# Patient Record
Sex: FEMALE | Race: WHITE | URBAN - METROPOLITAN AREA
[De-identification: names, ages, dates, MRNs, and addresses within clinical notes are randomized per-mention and may not be internally consistent; named-entity substitution may affect disease eponyms.]

---

## 2017-01-25 ENCOUNTER — PATIENT MESSAGE (OUTPATIENT)
Dept: FAMILY MEDICINE CLINIC | Age: 47
End: 2017-01-25

## 2017-01-25 NOTE — TELEPHONE ENCOUNTER
From: Abdi Vasquez  To: Tacos Gerardo MD  Sent: 1/25/2017 7:29 AM EST  Subject: Referral Request    Good Morning Minna Nova,  I have been a patient of Dr. Zoe Castro at [facility]; however my referral will be expiring in February. Can you please request an extension to my referral. I am scheduled to have RNY gastric bypass surgery with Dr. Tarun Bennett at [facility] on March 31st. Due to my blood clotting disorder, I am required to obtain a final pre surgical clearance from Dr. Larry Hernandez in the last two weeks of March. Launa said to please do this as a continuation of care and note that it is for a pre-surgical clearance. Thank you so much! I will be in to see Dr. Jeremie Friedman soon.

## 2017-01-25 NOTE — TELEPHONE ENCOUNTER
Please advise/order new referral with appropriate dx code for gastric bypass. Pt has  Prime, will to submit need auth.

## 2017-01-30 DIAGNOSIS — Z01.818 PRE-OP EXAM: ICD-10-CM

## 2017-01-30 DIAGNOSIS — D68.51 FACTOR V LEIDEN MUTATION (HCC): Primary | ICD-10-CM

## 2017-02-07 ENCOUNTER — OFFICE VISIT (OUTPATIENT)
Dept: FAMILY MEDICINE CLINIC | Age: 47
End: 2017-02-07

## 2017-02-07 VITALS
TEMPERATURE: 97 F | HEART RATE: 91 BPM | RESPIRATION RATE: 15 BRPM | DIASTOLIC BLOOD PRESSURE: 90 MMHG | OXYGEN SATURATION: 94 % | BODY MASS INDEX: 36.19 KG/M2 | SYSTOLIC BLOOD PRESSURE: 156 MMHG | HEIGHT: 64 IN | WEIGHT: 212 LBS

## 2017-02-07 DIAGNOSIS — I10 ESSENTIAL HYPERTENSION: ICD-10-CM

## 2017-02-07 DIAGNOSIS — R73.03 PRE-DIABETES: ICD-10-CM

## 2017-02-07 DIAGNOSIS — E55.9 VITAMIN D DEFICIENCY: Primary | ICD-10-CM

## 2017-02-07 RX ORDER — ACETAMINOPHEN 500 MG
TABLET ORAL 2 TIMES DAILY
COMMUNITY
End: 2017-08-03

## 2017-02-07 RX ORDER — HYDROCHLOROTHIAZIDE 12.5 MG/1
12.5 TABLET ORAL DAILY
Qty: 90 TAB | Refills: 1 | Status: SHIPPED | OUTPATIENT
Start: 2017-02-07 | End: 2017-08-03

## 2017-02-07 RX ORDER — METOPROLOL SUCCINATE 25 MG/1
25 TABLET, EXTENDED RELEASE ORAL DAILY
Qty: 90 TAB | Refills: 1 | Status: SHIPPED | OUTPATIENT
Start: 2017-02-07 | End: 2017-10-13

## 2017-02-07 NOTE — PROGRESS NOTES
1. Have you been to the ER, urgent care clinic since your last visit? Hospitalized since your last visit? No    2. Have you seen or consulted any other health care providers outside of the [address] [place] [address] since your last visit? Include any pap smears or colon screening.  Yes Where: Pt planning gastric bypass in Mar 2017, seen multiple drs to prepare

## 2017-02-07 NOTE — PROGRESS NOTES
HISTORY OF PRESENT ILLNESS  Ganesh Millan is a 48 y.o. female. HPI  Vit D def, improving, she is taking vit D daily    HTN, stable but she ran out of toprol for over a week, her bp is elevated today    Pre diabetes, stable, she is compliant with diet, lost 24 lbs since last visit  Review of Systems   Respiratory: Negative for shortness of breath. Cardiovascular: Negative for chest pain and palpitations. Gastrointestinal: Negative for abdominal pain and nausea. Neurological: Negative for headaches. Physical Exam   Cardiovascular: Normal rate, regular rhythm and normal heart sounds. Pulmonary/Chest: Effort normal and breath sounds normal.   Abdominal: Soft. There is no tenderness. Musculoskeletal: She exhibits no edema. Vitals reviewed. ASSESSMENT and Matthew Daughters was seen today for medication refill. Diagnoses and all orders for this visit:    Vitamin D deficiency, improving  -     VITAMIN D, 25 HYDROXY; Future    Essential hypertension, re start toprol and HCTZ, monitor bp  -     hydroCHLOROthiazide (HYDRODIURIL) 12.5 mg tablet; Take 1 Tab by mouth daily. -     metoprolol succinate (TOPROL-XL) 25 mg XL tablet; Take 1 Tab by mouth daily.  -     METABOLIC PANEL, BASIC; Future    Pre-diabetes, improving  -     HEMOGLOBIN A1C W/O EAG;  Future

## 2017-03-30 ENCOUNTER — OFFICE VISIT (OUTPATIENT)
Dept: FAMILY MEDICINE CLINIC | Age: 47
End: 2017-03-30

## 2017-03-30 VITALS
HEART RATE: 113 BPM | RESPIRATION RATE: 16 BRPM | OXYGEN SATURATION: 97 % | SYSTOLIC BLOOD PRESSURE: 112 MMHG | HEIGHT: 64 IN | TEMPERATURE: 97.9 F | WEIGHT: 207 LBS | DIASTOLIC BLOOD PRESSURE: 66 MMHG | BODY MASS INDEX: 35.34 KG/M2

## 2017-03-30 DIAGNOSIS — J11.1 INFLUENZA: Primary | ICD-10-CM

## 2017-03-30 DIAGNOSIS — R68.89 FLU-LIKE SYMPTOMS: ICD-10-CM

## 2017-03-30 DIAGNOSIS — Z87.19 HISTORY OF DIVERTICULITIS: ICD-10-CM

## 2017-03-30 LAB
FLUAV+FLUBV AG NOSE QL IA.RAPID: NEGATIVE POS/NEG
FLUAV+FLUBV AG NOSE QL IA.RAPID: POSITIVE POS/NEG
VALID INTERNAL CONTROL?: YES

## 2017-03-30 RX ORDER — OSELTAMIVIR PHOSPHATE 75 MG/1
75 CAPSULE ORAL 2 TIMES DAILY
Qty: 10 CAP | Refills: 0 | Status: SHIPPED | OUTPATIENT
Start: 2017-03-30 | End: 2017-04-04

## 2017-03-30 RX ORDER — HYDROCODONE POLISTIREX AND CHLORPHENIRAMINE POLISTIREX 10; 8 MG/5ML; MG/5ML
5 SUSPENSION, EXTENDED RELEASE ORAL
Qty: 120 ML | Refills: 0 | Status: SHIPPED | OUTPATIENT
Start: 2017-03-30 | End: 2017-04-21

## 2017-03-30 NOTE — MR AVS SNAPSHOT
Visit Information Date & Time Provider Department Dept. Phone Encounter #  
 3/30/2017 11:30 AM Lizz Santa [facility] [phone] [id number] Your Appointments 10/13/2017 10:45 AM  
ESTABLISHED PATIENT with Viry Garcia MD  
[facility] of [facility]. [address] ([facility]) Appt Note: 1YR F/UP HOLLY  
 [address] [address]  
[phone]  
  
   
 [place] [address] Upcoming Health Maintenance Date Due DTaP/Tdap/Td series (1 - Tdap) 11/25/1989 INFLUENZA AGE 9 TO ADULT 8/1/2016 PAP AKA CERVICAL CYTOLOGY 12/15/2017 Allergies as of 3/30/2017  Review Complete On: 3/30/2017 By: Ravi Choudhury MD  
  
 Severity Noted Reaction Type Reactions Bee Sting [Sting, Bee]  03/28/2012    Swelling Current Immunizations  Never Reviewed Name Date Influenza Vaccine 1/15/2013 Not reviewed this visit You Were Diagnosed With   
  
 Codes Comments Influenza    -  Primary ICD-10-CM: J11.1 ICD-9-CM: 651.0 Flu-like symptoms     ICD-10-CM: R68.89 ICD-9-CM: 780.99 History of diverticulitis     ICD-10-CM: Z87.19 ICD-9-CM: V12.70 Vitals BP Pulse Temp Resp Height(growth percentile) Weight(growth percentile) 112/66 (BP 1 Location: Left arm, BP Patient Position: Sitting) (!) 113 97.9 °F (36.6 °C) (Oral) 16 5' 4\" (1.626 m) 207 lb (93.9 kg) SpO2 BMI OB Status Smoking Status 97% 35.53 kg/m2 Having regular periods Never Smoker Vitals History BMI and BSA Data Body Mass Index Body Surface Area 35.53 kg/m 2 2.06 m 2 Preferred Pharmacy Pharmacy Name Phone [address] [facility] [address]  [facility] [phone] Your Updated Medication List  
  
   
This list is accurate as of: 3/30/17 11:55 AM.  Always use your most recent med list.  
  
  
  
  
 chlorpheniramine-HYDROcodone 10-8 mg/5 mL suspension Commonly known as:  Galo Saas Take 5 mL by mouth every twelve (12) hours as needed for Cough. Max Daily Amount: 10 mL. Cholecalciferol (Vitamin D3) 2,000 unit Cap capsule Commonly known as:  VITAMIN D3 Take  by mouth two (2) times a day. hydroCHLOROthiazide 12.5 mg tablet Commonly known as:  HYDRODIURIL Take 1 Tab by mouth daily. metoprolol succinate 25 mg XL tablet Commonly known as:  TOPROL-XL Take 1 Tab by mouth daily. oseltamivir 75 mg capsule Commonly known as:  TAMIFLU Take 1 Cap by mouth two (2) times a day for 5 days. Prescriptions Printed Refills  
 chlorpheniramine-HYDROcodone (TUSSIONEX) 10-8 mg/5 mL suspension 0 Sig: Take 5 mL by mouth every twelve (12) hours as needed for Cough. Max Daily Amount: 10 mL. Class: Print Route: Oral  
  
Prescriptions Sent to Pharmacy Refills  
 oseltamivir (TAMIFLU) 75 mg capsule 0 Sig: Take 1 Cap by mouth two (2) times a day for 5 days. Class: Normal  
 Pharmacy: [facility] [address] [facility], [address] [place] [address].  #: [phone] Route: Oral  
  
We Performed the Following AMB POC ROLANDA INFLUENZA A/B TEST [95275 CPT(R)] Patient Instructions Oseltamiver 75 mg twice daily x 5 days, start right away Increase fluids, rest, ibuprofen or tylenol as needed for body aches and fever Tussionex, no more than 5 mL every 12 hours if needed for cough Follow up for new symptoms, worsening symptoms or failure to improve. Influenza (Flu): Care Instructions Your Care Instructions Influenza (flu) is an infection in the lungs and breathing passages. It is caused by the influenza virus. There are different strains, or types, of the flu virus from year to year.  Unlike the common cold, the flu comes on suddenly and the symptoms, such as a cough, congestion, fever, chills, fatigue, aches, and pains, are more severe. These symptoms may last up to 10 days. Although the flu can make you feel very sick, it usually doesn't cause serious health problems. Home treatment is usually all you need for flu symptoms. But your doctor may prescribe antiviral medicine to prevent other health problems, such as pneumonia, from developing. Older people and those who have a long-term health condition, such as lung disease, are most at risk for having pneumonia or other health problems. Follow-up care is a key part of your treatment and safety. Be sure to make and go to all appointments, and call your doctor if you are having problems. Its also a good idea to know your test results and keep a list of the medicines you take. How can you care for yourself at home? · Get plenty of rest. 
· Drink plenty of fluids, enough so that your urine is light yellow or clear like water. If you have kidney, heart, or liver disease and have to limit fluids, talk with your doctor before you increase the amount of fluids you drink. · Take an over-the-counter pain medicine if needed, such as acetaminophen (Tylenol), ibuprofen (Advil, Motrin), or naproxen (Aleve), to relieve fever, headache, and muscle aches. Read and follow all instructions on the label. No one younger than 20 should take aspirin. It has been linked to Reye syndrome, a serious illness. · Do not smoke. Smoking can make the flu worse. If you need help quitting, talk to your doctor about stop-smoking programs and medicines. These can increase your chances of quitting for good. · Breathe moist air from a hot shower or from a sink filled with hot water to help clear a stuffy nose. · Before you use cough and cold medicines, check the label. These medicines may not be safe for young children or for people with certain health problems. · If the skin around your nose and lips becomes sore, put some petroleum jelly on the area. · To ease coughing: ¨ Drink fluids to soothe a scratchy throat. ¨ Suck on cough drops or plain hard candy. ¨ Take an over-the-counter cough medicine that contains dextromethorphan to help you get some sleep. Read and follow all instructions on the label. ¨ Raise your head at night with an extra pillow. This may help you rest if coughing keeps you awake. · Take any prescribed medicine exactly as directed. Call your doctor if you think you are having a problem with your medicine. To avoid spreading the flu · Wash your hands regularly, and keep your hands away from your face. · Stay home from school, work, and other public places until you are feeling better and your fever has been gone for at least 24 hours. The fever needs to have gone away on its own without the help of medicine. · Ask people living with you to talk to their doctors about preventing the flu. They may get antiviral medicine to keep from getting the flu from you. · To prevent the flu in the future, get a flu vaccine every fall. Encourage people living with you to get the vaccine. · Cover your mouth when you cough or sneeze. When should you call for help? Call 911 anytime you think you may need emergency care. For example, call if: 
· You have severe trouble breathing. Call your doctor now or seek immediate medical care if: 
· You have new or worse trouble breathing. · You seem to be getting much sicker. · You feel very sleepy or confused. · You have a new or higher fever. · You get a new rash. Watch closely for changes in your health, and be sure to contact your doctor if: 
· You begin to get better and then get worse. · You are not getting better after 1 week. Where can you learn more? Go to http://ishmael-megan.info/. Enter X575 in the search box to learn more about \"Influenza (Flu): Care Instructions. \" 
 Current as of: May 23, 2016 Content Version: 11.2 © 0059-1126 [employer]. Care instructions adapted under license by [employer] (which disclaims liability or warranty for this information). If you have questions about a medical condition or this instruction, always ask your healthcare professional. [address] any warranty or liability for your use of this information. Introducing [facility]! Dear Katharine Real: 
Thank you for requesting a Storage By The Box account. Our records indicate that you already have an active [employer] The Box account. You can access your account anytime at [email or web address] Did you know that you can access your hospital and ER discharge instructions at any time in Storage By The Box? You can also review all of your test results from your hospital stay or ER visit. Additional Information If you have questions, please visit the Frequently Asked Questions section of the [employer] Box website at [email or web address] Remember, Storage By The Box is NOT to be used for urgent needs. For medical emergencies, dial 911. Now available from your iPhone and Android! Please provide this summary of care documentation to your next provider. Your primary care clinician is listed as Ysabel Thorpe. If you have any questions after today's visit, please call [phone].

## 2017-03-30 NOTE — PROGRESS NOTES
HISTORY OF PRESENT ILLNESS  Brandyn Ramirez is a 48 y.o. female. HPI Comments: She is just completeing courses of Cipro and Flagyl prescribed at the [facility] ED for divertivulitis. Abdominal Pain   The history is provided by the patient and medical records. This is a recurrent problem. Episode onset: about 10 days ago, seen a week ago at [facility] ED, better with antibiotic treatment. Associated symptoms include abdominal pain and headaches. Cold Symptoms   The history is provided by the patient and medical records. This is a new problem. Episode onset: 3 days ago. Associated symptoms include chills, ear pain (pressure, this morning), headaches, myalgias and nausea. Pertinent negatives include no sore throat and no vomiting. Review of Systems   Constitutional: Positive for chills, fever (subjective) and malaise/fatigue. HENT: Positive for congestion (mild nasal) and ear pain (pressure, this morning). Negative for sore throat. Respiratory: Positive for cough and sputum production (yesterday, mucous, dry today). Gastrointestinal: Positive for abdominal pain and nausea. Negative for diarrhea and vomiting. Genitourinary: Negative for dysuria, frequency and urgency. Musculoskeletal: Positive for myalgias. Neurological: Positive for headaches. Visit Vitals    /66 (BP 1 Location: Left arm, BP Patient Position: Sitting)    Pulse (!) 113    Temp 97.9 °F (36.6 °C) (Oral)    Resp 16    Ht 5' 4\" (1.626 m)    Wt 207 lb (93.9 kg)    SpO2 97%    BMI 35.53 kg/m2       Physical Exam   Constitutional: She is oriented to person, place, and time. She appears well-developed and well-nourished. HENT:   Head: Normocephalic. Right Ear: Tympanic membrane and ear canal normal.   Left Ear: Tympanic membrane and ear canal normal.   Mouth/Throat: Oropharynx is clear and moist.   Eyes: Conjunctivae and EOM are normal.   Neck: Neck supple.    Cardiovascular: Normal rate, regular rhythm and normal heart sounds. Pulmonary/Chest: Effort normal and breath sounds normal.   Abdominal: Soft. Bowel sounds are normal. There is tenderness (mild LLQ). There is no rebound and no guarding. Lymphadenopathy:     She has no cervical adenopathy. Neurological: She is alert and oriented to person, place, and time. Skin: Skin is warm and dry. Psychiatric: She has a normal mood and affect. Her behavior is normal.   Nursing note and vitals reviewed. Rapid flu positive for influenza type B  ASSESSMENT and PLAN    ICD-10-CM ICD-9-CM    1. Influenza J11.1 487.1 oseltamivir (TAMIFLU) 75 mg capsule   2. Flu-like symptoms R68.89 780.99 AMB POC ROLANDA INFLUENZA A/B TEST      chlorpheniramine-HYDROcodone (TUSSIONEX) 10-8 mg/5 mL suspension   Oseltamiver 75 mg twice daily x 5 days, start right away  Increase fluids, rest, ibuprofen or tylenol as needed for body aches and fever  Tussionex, no more than 5 mL every 12 hours if needed for cough  Follow up for new symptoms, worsening symptoms or failure to improve.

## 2017-03-30 NOTE — PROGRESS NOTES
Susi Aase is a 48 y.o. female here for cold symptoms      1. Have you been to the ER, urgent care clinic or hospitalized since your last visit? [facility]    2. Have you seen or consulted any other health care providers outside of the [address] [place] [address] since your last visit (Include any pap smears or colon screening)? NO      Do you have an Advanced Directive? NO    Would you like information on Advanced Directives?  NO

## 2017-03-30 NOTE — PATIENT INSTRUCTIONS
Oseltamiver 75 mg twice daily x 5 days, start right away  Increase fluids, rest, ibuprofen or tylenol as needed for body aches and fever  Tussionex, no more than 5 mL every 12 hours if needed for cough  Follow up for new symptoms, worsening symptoms or failure to improve. Influenza (Flu): Care Instructions  Your Care Instructions  Influenza (flu) is an infection in the lungs and breathing passages. It is caused by the influenza virus. There are different strains, or types, of the flu virus from year to year. Unlike the common cold, the flu comes on suddenly and the symptoms, such as a cough, congestion, fever, chills, fatigue, aches, and pains, are more severe. These symptoms may last up to 10 days. Although the flu can make you feel very sick, it usually doesn't cause serious health problems. Home treatment is usually all you need for flu symptoms. But your doctor may prescribe antiviral medicine to prevent other health problems, such as pneumonia, from developing. Older people and those who have a long-term health condition, such as lung disease, are most at risk for having pneumonia or other health problems. Follow-up care is a key part of your treatment and safety. Be sure to make and go to all appointments, and call your doctor if you are having problems. Its also a good idea to know your test results and keep a list of the medicines you take. How can you care for yourself at home? · Get plenty of rest.  · Drink plenty of fluids, enough so that your urine is light yellow or clear like water. If you have kidney, heart, or liver disease and have to limit fluids, talk with your doctor before you increase the amount of fluids you drink. · Take an over-the-counter pain medicine if needed, such as acetaminophen (Tylenol), ibuprofen (Advil, Motrin), or naproxen (Aleve), to relieve fever, headache, and muscle aches. Read and follow all instructions on the label. No one younger than 20 should take aspirin. It has been linked to Reye syndrome, a serious illness. · Do not smoke. Smoking can make the flu worse. If you need help quitting, talk to your doctor about stop-smoking programs and medicines. These can increase your chances of quitting for good. · Breathe moist air from a hot shower or from a sink filled with hot water to help clear a stuffy nose. · Before you use cough and cold medicines, check the label. These medicines may not be safe for young children or for people with certain health problems. · If the skin around your nose and lips becomes sore, put some petroleum jelly on the area. · To ease coughing:  ¨ Drink fluids to soothe a scratchy throat. ¨ Suck on cough drops or plain hard candy. ¨ Take an over-the-counter cough medicine that contains dextromethorphan to help you get some sleep. Read and follow all instructions on the label. ¨ Raise your head at night with an extra pillow. This may help you rest if coughing keeps you awake. · Take any prescribed medicine exactly as directed. Call your doctor if you think you are having a problem with your medicine. To avoid spreading the flu  · Wash your hands regularly, and keep your hands away from your face. · Stay home from school, work, and other public places until you are feeling better and your fever has been gone for at least 24 hours. The fever needs to have gone away on its own without the help of medicine. · Ask people living with you to talk to their doctors about preventing the flu. They may get antiviral medicine to keep from getting the flu from you. · To prevent the flu in the future, get a flu vaccine every fall. Encourage people living with you to get the vaccine. · Cover your mouth when you cough or sneeze. When should you call for help? Call 911 anytime you think you may need emergency care. For example, call if:  · You have severe trouble breathing.   Call your doctor now or seek immediate medical care if:  · You have new or worse trouble breathing. · You seem to be getting much sicker. · You feel very sleepy or confused. · You have a new or higher fever. · You get a new rash. Watch closely for changes in your health, and be sure to contact your doctor if:  · You begin to get better and then get worse. · You are not getting better after 1 week. Where can you learn more? Go to http://ishmael-megan.info/. Enter F090 in the search box to learn more about \"Influenza (Flu): Care Instructions. \"  Current as of: May 23, 2016  Content Version: 11.2  © 0526-0963 Logi-Serve. Care instructions adapted under license by [employer] (which disclaims liability or warranty for this information). If you have questions about a medical condition or this instruction, always ask your healthcare professional. [address] any warranty or liability for your use of this information.

## 2017-03-30 NOTE — LETTER
NOTIFICATION RETURN TO WORK / SCHOOL 
 
3/30/2017 11:53 AM 
 
Ms. Mick Leon [place] [address] [place] [address] To Whom It May Concern: Mick Leon is currently under the care of [address] [place] [address]. She has been diagnosed with influenza. She will return to work/school on: 4/3/2017 If there are questions or concerns please have the patient contact our office. Sincerely, Jil Sutton MD

## 2017-04-21 ENCOUNTER — OFFICE VISIT (OUTPATIENT)
Dept: FAMILY MEDICINE CLINIC | Age: 47
End: 2017-04-21

## 2017-04-21 VITALS
SYSTOLIC BLOOD PRESSURE: 118 MMHG | WEIGHT: 210.6 LBS | OXYGEN SATURATION: 97 % | RESPIRATION RATE: 16 BRPM | BODY MASS INDEX: 35.96 KG/M2 | HEART RATE: 80 BPM | HEIGHT: 64 IN | TEMPERATURE: 98 F | DIASTOLIC BLOOD PRESSURE: 82 MMHG

## 2017-04-21 DIAGNOSIS — R05.8 POST-VIRAL COUGH SYNDROME: Primary | ICD-10-CM

## 2017-04-21 RX ORDER — BENZONATATE 200 MG/1
CAPSULE ORAL
Qty: 15 CAP | Refills: 2 | Status: SHIPPED | OUTPATIENT
Start: 2017-04-21 | End: 2017-08-03

## 2017-04-21 RX ORDER — METHYLPREDNISOLONE 4 MG/1
TABLET ORAL
Qty: 1 DOSE PACK | Refills: 0 | Status: SHIPPED | OUTPATIENT
Start: 2017-04-21 | End: 2017-08-03

## 2017-04-21 NOTE — MR AVS SNAPSHOT
Visit Information Date & Time Provider Department Dept. Phone Encounter #  
 4/21/2017  7:45 AM Jas Eugene, [facility] [phone] [id number] Your Appointments 10/13/2017 10:45 AM  
ESTABLISHED PATIENT with Guillermo Velásquez MD  
[facility] of [facility] ([address]) Appt Note: 1YR F/UP HOLLY  
 [address] [address]  
[phone]  
  
   
 [address] [address] Upcoming Health Maintenance Date Due DTaP/Tdap/Td series (1 - Tdap) 11/25/1989 INFLUENZA AGE 9 TO ADULT 8/1/2016 PAP AKA CERVICAL CYTOLOGY 12/15/2017 Allergies as of 4/21/2017  Review Complete On: 4/21/2017 By: Jas Eugene MD  
  
 Severity Noted Reaction Type Reactions Bee Sting [Sting, Bee]  03/28/2012    Swelling Current Immunizations  Never Reviewed Name Date Influenza Vaccine 1/15/2013 Not reviewed this visit You Were Diagnosed With   
  
 Codes Comments Post-viral cough syndrome    -  Primary ICD-10-CM: H18 ICD-9-CM: 098. 2 Vitals BP Pulse Temp Resp Height(growth percentile) Weight(growth percentile) 118/82 (BP 1 Location: Left arm, BP Patient Position: Sitting) 80 98 °F (36.7 °C) (Oral) 16 5' 4\" (1.626 m) 210 lb 9.6 oz (95.5 kg) SpO2 BMI OB Status Smoking Status 97% 36.15 kg/m2 Having regular periods Never Smoker BMI and BSA Data Body Mass Index Body Surface Area  
 36.15 kg/m 2 2.08 m 2 Preferred Pharmacy Pharmacy Name Phone [address] [facility], [address]  [facility] [phone] Your Updated Medication List  
  
   
This list is accurate as of: 4/21/17  7:58 AM.  Always use your most recent med list.  
  
  
  
  
 benzonatate 200 mg capsule Commonly known as:  TESSALON Take one capsule 3 times daily if needed for cough Cholecalciferol (Vitamin D3) 2,000 unit Cap capsule Commonly known as:  VITAMIN D3 Take  by mouth two (2) times a day. hydroCHLOROthiazide 12.5 mg tablet Commonly known as:  HYDRODIURIL Take 1 Tab by mouth daily. methylPREDNISolone 4 mg tablet Commonly known as:  Caye Ser Follow dose pack instructions  
  
 metoprolol succinate 25 mg XL tablet Commonly known as:  TOPROL-XL Take 1 Tab by mouth daily. Prescriptions Sent to Pharmacy Refills  
 methylPREDNISolone (MEDROL DOSEPACK) 4 mg tablet 0 Sig: Follow dose pack instructions Class: Normal  
 Pharmacy: [facility] [address] AT [address] & [facility] Ph #: [phone]  
 benzonatate (TESSALON) 200 mg capsule 2 Sig: Take one capsule 3 times daily if needed for cough Class: Normal  
 Pharmacy: [facility] [address], [address] [place] [address]. Ph #: [phone] Patient Instructions Medrol dose pack as per instructions Tessalon Perls - Take one capsule 3 times daily if needed for cough Follow up for new symptoms, worsening symptoms or failure to improve. Introducing [facility]! Dear Norman Porras: 
Thank you for requesting a Fleck account. Our records indicate that you already have an active Fleck account. You can access your account anytime at [email or web address] Did you know that you can access your hospital and ER discharge instructions at any time in Fleck? You can also review all of your test results from your hospital stay or ER visit. Additional Information If you have questions, please visit the Frequently Asked Questions section of the Fleck website at [email or web address] Remember, Fleck is NOT to be used for urgent needs. For medical emergencies, dial 911. Now available from your iPhone and Android! Please provide this summary of care documentation to your next provider. Your primary care clinician is listed as Shruti Ashton. If you have any questions after today's visit, please call [phone].

## 2017-04-21 NOTE — PROGRESS NOTES
HISTORY OF PRESENT ILLNESS  Leighann Rhoades is a 48 y.o. female. HPI Comments: Seen with influenza 3 weeks ago, now with persistent dry cough    Cough   The history is provided by the patient and medical records. This is a chronic problem. Episode onset: 3 weeks ago. Pertinent negatives include no chest pain, no headaches and no shortness of breath. Review of Systems   Constitutional: Negative for chills and fever. HENT: Negative for congestion. Respiratory: Positive for cough. Negative for sputum production, shortness of breath and wheezing. Cardiovascular: Negative for chest pain and palpitations. Gastrointestinal: Negative for nausea and vomiting. Neurological: Negative for headaches. Endo/Heme/Allergies: Negative for environmental allergies. Visit Vitals    /82 (BP 1 Location: Left arm, BP Patient Position: Sitting)    Pulse 80    Temp 98 °F (36.7 °C) (Oral)    Resp 16    Ht 5' 4\" (1.626 m)    Wt 210 lb 9.6 oz (95.5 kg)    SpO2 97%    BMI 36.15 kg/m2       Physical Exam   Constitutional: She is oriented to person, place, and time. She appears well-developed and well-nourished. HENT:   Head: Normocephalic. Right Ear: Tympanic membrane and ear canal normal.   Left Ear: Tympanic membrane and ear canal normal.   Mouth/Throat: Oropharynx is clear and moist.   Eyes: Conjunctivae and EOM are normal.   Neck: Neck supple. Cardiovascular: Normal rate, regular rhythm and normal heart sounds. Pulmonary/Chest: Effort normal and breath sounds normal.   Lymphadenopathy:     She has no cervical adenopathy. Neurological: She is alert and oriented to person, place, and time. Skin: Skin is warm and dry. Psychiatric: She has a normal mood and affect. Her behavior is normal.   Nursing note and vitals reviewed.       ASSESSMENT and PLAN    ICD-10-CM ICD-9-CM    1. Post-viral cough syndrome R05 786.2 methylPREDNISolone (MEDROL DOSEPACK) 4 mg tablet      benzonatate (TESSALON) 200 mg capsule   Medrol dose pack as per instructions  Tessalon Perls - Take one capsule 3 times daily if needed for cough  Follow up for new symptoms, worsening symptoms or failure to improve.

## 2017-04-21 NOTE — PROGRESS NOTES
Lawson Tesfaey is a 48 y.o. female here for cough      1. Have you been to the ER, urgent care clinic or hospitalized since your last visit? NO.     2. Have you seen or consulted any other health care providers outside of the [address] [place] [address] since your last visit (Include any pap smears or colon screening)? NO      Do you have an Advanced Directive? NO    Would you like information on Advanced Directives?  NO

## 2017-04-21 NOTE — PATIENT INSTRUCTIONS
Medrol dose pack as per instructions  Tessalon Perls - Take one capsule 3 times daily if needed for cough  Follow up for new symptoms, worsening symptoms or failure to improve.

## 2017-08-03 ENCOUNTER — OFFICE VISIT (OUTPATIENT)
Dept: FAMILY MEDICINE CLINIC | Age: 47
End: 2017-08-03

## 2017-08-03 VITALS
BODY MASS INDEX: 31.49 KG/M2 | TEMPERATURE: 97 F | OXYGEN SATURATION: 95 % | SYSTOLIC BLOOD PRESSURE: 110 MMHG | WEIGHT: 189 LBS | HEART RATE: 92 BPM | DIASTOLIC BLOOD PRESSURE: 84 MMHG | HEIGHT: 65 IN | RESPIRATION RATE: 20 BRPM

## 2017-08-03 DIAGNOSIS — Z98.84 S/P GASTRIC BYPASS: ICD-10-CM

## 2017-08-03 DIAGNOSIS — I10 ESSENTIAL HYPERTENSION: Primary | ICD-10-CM

## 2017-08-03 DIAGNOSIS — E55.9 VITAMIN D DEFICIENCY: ICD-10-CM

## 2017-08-03 RX ORDER — LANOLIN ALCOHOL/MO/W.PET/CERES
1000 CREAM (GRAM) TOPICAL
COMMUNITY

## 2017-08-03 RX ORDER — CHOLECALCIFEROL TAB 125 MCG (5000 UNIT) 125 MCG
TAB ORAL DAILY
COMMUNITY

## 2017-08-03 RX ORDER — ENOXAPARIN SODIUM 100 MG/ML
40 INJECTION SUBCUTANEOUS
COMMUNITY
End: 2017-10-13

## 2017-08-03 NOTE — PROGRESS NOTES
HISTORY OF PRESENT ILLNESS  Radha Arevalo is a 48 y.o. female. HPI  HTN, stable, bp is well controlled  Vit D def, stable on 5000 units daily  S/p recent gastric bypass surgery, doing well, lost 20 lbs already, her labs are monitored by surgery for now  Review of Systems   Cardiovascular: Negative for chest pain and palpitations. Gastrointestinal: Negative for abdominal pain and nausea. Neurological: Negative for headaches. Physical Exam   Constitutional: She is oriented to person, place, and time. Cardiovascular: Normal rate, regular rhythm and normal heart sounds. No murmur heard. Pulmonary/Chest: Effort normal and breath sounds normal.   Abdominal: Soft. There is no tenderness. Neurological: She is alert and oriented to person, place, and time. Vitals reviewed. ASSESSMENT and PLAN  Diagnoses and all orders for this visit:    1. Essential hypertension, stable    2. Vitamin D deficiency, stable    3.  S/P gastric bypass  Continue current meds    rtc 3 mos

## 2017-08-03 NOTE — PROGRESS NOTES
1. Have you been to the ER, urgent care clinic since your last visit? Hospitalized since your last visit? Yes When: 7/24/17 [facility]    2. Have you seen or consulted any other health care providers outside of the [address] [place] [address] since your last visit? Include any pap smears or colon screening.  Yes When: 6/301/17 Colonoscopy- GI

## 2017-09-20 ENCOUNTER — OFFICE VISIT (OUTPATIENT)
Dept: FAMILY MEDICINE CLINIC | Age: 47
End: 2017-09-20

## 2017-09-20 ENCOUNTER — HOSPITAL ENCOUNTER (OUTPATIENT)
Dept: LAB | Age: 47
Discharge: HOME | End: 2017-09-20
Payer: OTHER GOVERNMENT

## 2017-09-20 VITALS
HEART RATE: 74 BPM | BODY MASS INDEX: 29.69 KG/M2 | RESPIRATION RATE: 18 BRPM | WEIGHT: 178.2 LBS | TEMPERATURE: 97.8 F | HEIGHT: 65 IN | SYSTOLIC BLOOD PRESSURE: 100 MMHG | DIASTOLIC BLOOD PRESSURE: 82 MMHG | OXYGEN SATURATION: 98 %

## 2017-09-20 DIAGNOSIS — R30.9 URINARY PAIN: Primary | ICD-10-CM

## 2017-09-20 DIAGNOSIS — R30.9 URINARY PAIN: ICD-10-CM

## 2017-09-20 LAB
BILIRUB UR QL STRIP: NORMAL
GLUCOSE UR-MCNC: NEGATIVE MG/DL
KETONES P FAST UR STRIP-MCNC: NEGATIVE MG/DL
PH UR STRIP: 6.5 [PH] (ref 4.6–8)
PROT UR QL STRIP: NEGATIVE MG/DL
SP GR UR STRIP: 1.02 (ref 1–1.03)
UA UROBILINOGEN AMB POC: NORMAL (ref 0.2–1)
URINALYSIS CLARITY POC: CLEAR
URINALYSIS COLOR POC: YELLOW
URINE BLOOD POC: NEGATIVE
URINE LEUKOCYTES POC: NEGATIVE
URINE NITRITES POC: NEGATIVE

## 2017-09-20 PROCEDURE — 87086 URINE CULTURE/COLONY COUNT: CPT | Performed by: INTERNAL MEDICINE

## 2017-09-20 RX ORDER — NITROFURANTOIN 25; 75 MG/1; MG/1
CAPSULE ORAL
COMMUNITY
Start: 2017-09-08 | End: 2017-10-13

## 2017-09-20 NOTE — PATIENT INSTRUCTIONS
Painful Urination (Dysuria): Care Instructions  Your Care Instructions  Burning pain with urination (dysuria) is a common symptom of a urinary tract infection or other urinary problems. The bladder may become inflamed. This can cause pain when the bladder fills and empties. You may also feel pain if the tube that carries urine from the bladder to the outside of the body (urethra) gets irritated or infected. Sexually transmitted infections (STIs) also may cause pain when you urinate. Sometimes the pain can be caused by things other than an infection. The urethra can be irritated by soaps, perfumes, or foreign objects in the urethra. Kidney stones can cause pain when they pass through the urethra. The cause may be hard to find. You may need tests. Treatment for painful urination depends on the cause. Follow-up care is a key part of your treatment and safety. Be sure to make and go to all appointments, and call your doctor if you are having problems. It's also a good idea to know your test results and keep a list of the medicines you take. How can you care for yourself at home? · Drink extra water for the next day or two. This will help make the urine less concentrated. (If you have kidney, heart, or liver disease and have to limit fluids, talk with your doctor before you increase the amount of fluids you drink.)  · Avoid drinks that are carbonated or have caffeine. They can irritate the bladder. · Urinate often. Try to empty your bladder each time. For women:  · Urinate right after you have sex. · After going to the bathroom, wipe from front to back. · Avoid douches, bubble baths, and feminine hygiene sprays. And avoid other feminine hygiene products that have deodorants. When should you call for help? Call your doctor now or seek immediate medical care if:  · You have new symptoms, such as fever, nausea, or vomiting. · You have new or worse symptoms of a urinary problem.  For example:  ¨ You have blood or pus in your urine. ¨ You have chills or body aches. ¨ It hurts worse to urinate. ¨ You have groin or belly pain. ¨ You have pain in your back just below your rib cage (the flank area). Watch closely for changes in your health, and be sure to contact your doctor if you have any problems. Where can you learn more? Go to http://ishmael-megan.info/. Enter K332 in the search box to learn more about \"Painful Urination (Dysuria): Care Instructions. \"  Current as of: November 28, 2016  Content Version: 11.3  © 2790-2776 [employer]. Care instructions adapted under license by iodine (which disclaims liability or warranty for this information). If you have questions about a medical condition or this instruction, always ask your healthcare professional. [address] any warranty or liability for your use of this information.

## 2017-09-20 NOTE — MR AVS SNAPSHOT
Visit Information Date & Time Provider Department Dept. Phone Encounter #  
 9/20/2017  9:45 AM Elizabeth Medina [facility] [phone] [id number] Follow-up Instructions Return if symptoms worsen or fail to improve. Your Appointments 10/13/2017 10:45 AM  
ESTABLISHED PATIENT with Severa Redder, MD  
[facility] ([address]) Appt Note: 1YR F/UP HOLLY  
 [address] [address]  
[phone]  
  
   
 [place] [id number] Upcoming Health Maintenance Date Due DTaP/Tdap/Td series (1 - Tdap) 11/25/1989 INFLUENZA AGE 9 TO ADULT 8/1/2017 PAP AKA CERVICAL CYTOLOGY 12/15/2017 Allergies as of 9/20/2017  Review Complete On: 9/20/2017 By: Elizabeth Medina MD  
  
 Severity Noted Reaction Type Reactions Bee Sting [Sting, Bee]  03/28/2012    Swelling Current Immunizations  Never Reviewed Name Date Influenza Vaccine 1/15/2013 Not reviewed this visit You Were Diagnosed With   
  
 Codes Comments Urinary pain    -  Primary ICD-10-CM: R30.9 ICD-9-CM: 094. 1 Vitals BP Pulse Temp Resp Height(growth percentile) Weight(growth percentile) 100/82 (BP 1 Location: Left arm, BP Patient Position: Sitting) 74 97.8 °F (36.6 °C) (Oral) 18 5' 5\" (1.651 m) 178 lb 3.2 oz (80.8 kg) LMP SpO2 BMI OB Status Smoking Status 08/26/2017 98% 29.65 kg/m2 Having regular periods Never Smoker BMI and BSA Data Body Mass Index Body Surface Area  
 29.65 kg/m 2 1.92 m 2 Preferred Pharmacy Pharmacy Name Phone [address] [facility], [facility] [address] AT [address] [place] [address] & [facility] [phone] Your Updated Medication List  
  
   
This list is accurate as of: 9/20/17 10:49 AM.  Always use your most recent med list.  
  
  
  
  
 CALCIUM 500 PO Take 1,000 mg by mouth daily. cholecalciferol (VITAMIN D3) 5,000 unit Tab tablet Commonly known as:  VITAMIN D3 Take  by mouth daily. cyanocobalamin 1,000 mcg tablet Take 1,000 mcg by mouth daily. LOVENOX 40 mg/0.4 mL Generic drug:  enoxaparin 40 mg by SubCUTAneous route. metoprolol succinate 25 mg XL tablet Commonly known as:  TOPROL-XL Take 1 Tab by mouth daily. multivitamins Chew Take  by mouth daily. nitrofurantoin (macrocrystal-monohydrate) 100 mg capsule Commonly known as:  MACROBID We Performed the Following AMB POC URINALYSIS DIP STICK AUTO W/O MICRO [30094 CPT(R)] Follow-up Instructions Return if symptoms worsen or fail to improve. Patient Instructions Painful Urination (Dysuria): Care Instructions Your Care Instructions Burning pain with urination (dysuria) is a common symptom of a urinary tract infection or other urinary problems. The bladder may become inflamed. This can cause pain when the bladder fills and empties. You may also feel pain if the tube that carries urine from the bladder to the outside of the body (urethra) gets irritated or infected. Sexually transmitted infections (STIs) also may cause pain when you urinate. Sometimes the pain can be caused by things other than an infection. The urethra can be irritated by soaps, perfumes, or foreign objects in the urethra. Kidney stones can cause pain when they pass through the urethra. The cause may be hard to find. You may need tests. Treatment for painful urination depends on the cause. Follow-up care is a key part of your treatment and safety. Be sure to make and go to all appointments, and call your doctor if you are having problems. It's also a good idea to know your test results and keep a list of the medicines you take. How can you care for yourself at home? · Drink extra water for the next day or two.  This will help make the urine less concentrated. (If you have kidney, heart, or liver disease and have to limit fluids, talk with your doctor before you increase the amount of fluids you drink.) · Avoid drinks that are carbonated or have caffeine. They can irritate the bladder. · Urinate often. Try to empty your bladder each time. For women: · Urinate right after you have sex. · After going to the bathroom, wipe from front to back. · Avoid douches, bubble baths, and feminine hygiene sprays. And avoid other feminine hygiene products that have deodorants. When should you call for help? Call your doctor now or seek immediate medical care if: 
· You have new symptoms, such as fever, nausea, or vomiting. · You have new or worse symptoms of a urinary problem. For example: ¨ You have blood or pus in your urine. ¨ You have chills or body aches. ¨ It hurts worse to urinate. ¨ You have groin or belly pain. ¨ You have pain in your back just below your rib cage (the flank area). Watch closely for changes in your health, and be sure to contact your doctor if you have any problems. Where can you learn more? Go to http://ishmael-megan.info/. Enter K040 in the search box to learn more about \"Painful Urination (Dysuria): Care Instructions. \" Current as of: November 28, 2016 Content Version: 11.3 © 0168-0141 [employer]. Care instructions adapted under license by [employer] (which disclaims liability or warranty for this information). If you have questions about a medical condition or this instruction, always ask your healthcare professional. [address] any warranty or liability for your use of this information. Introducing [facility] [place] [facility]! Dear Boo Stafford: 
Thank you for requesting a [employer] account. Our records indicate that you already have an active [employer] account. You can access your account anytime at [email or web address] Did you know that you can access your hospital and ER discharge instructions at any time in Un[employer]in? You can also review all of your test results from your hospital stay or ER visit. Additional Information If you have questions, please visit the Frequently Asked Questions section of the [employer] website at [email or web address] Remember, Un[employer]in is NOT to be used for urgent needs. For medical emergencies, dial 911. Now available from your iPhone and Android! Please provide this summary of care documentation to your next provider. Your primary care clinician is listed as Nomi Zuleta. If you have any questions after today's visit, please call [phone].

## 2017-09-20 NOTE — PROGRESS NOTES
Assessment/Plan:    *Diagnoses and all orders for this visit:    1. Urinary pain  -     AMB POC URINALYSIS DIP STICK AUTO W/O MICRO  -     CULTURE, URINE; Future        Will await results of culture. The plan was discussed with the patient. The patient verbalized understanding and is in agreement with the plan. All medication potential side effects were discussed with the patient.    -------------------------------------------------------------------------------------------------------------------        Elijah Armstrong is a 48 y.o. female and presents with Urinary Pain (x2 weeks )         Subjective:  Pt here to f/u after a recent UTI diagnosis. She was seen at [facility], treated with Ana Rosa Lorenz. They made mention about wanting to make she absorbed the abx well b/c she just had a gastric bypass procedure 8 weeks ago. She was advised by the pharmacist to open the macrobid capsules, pour the powder into apple sauce and consume it this way. She did all this well but was advised by [facility] to have her urine rechecked to make sure she was clear. She feels good today. ROS:  Constitutional: No recent weight change. No weakness/fatigue. No f/c. Skin: No rashes, change in nails/hair, itching   HENT: No HA, dizziness. No hearing loss/tinnitus. No nasal congestion/discharge. Eyes: No change in vision, double/blurred vision or eye pain/redness. Cardiovascular: No CP/palpitations. No CA/orthopnea/PND. Respiratory: No cough/sputum, dyspnea, wheezing. Gastointestinal: No dysphagia, reflux. No n/v. No constipation/diarrhea. No melena/rectal bleeding. Genitourinary: No dysuria, urinary hesitancy, nocturia, hematuria. No incontinence. Musculoskeletal: No joint pain/stiffness. No muscle pain/tenderness. Endo: No heat/cold intolerance, no polyuria/polydypsia. Heme: No h/o anemia. No easy bleeding/bruising. Allergy/Immunology: No seasonal rhinitis.  Denies frequent colds, sinus/ear infections. Neurological: No seizures/numbness/weakness. No paresthesias. Psychiatric:  No depression, anxiety. The problem list was updated as a part of today's visit. Patient Active Problem List   Diagnosis Code    PE (pulmonary embolism) I26.99    Other and unspecified hyperlipidemia E78.5    Recurrent UTI N39.0    Factor V Leiden mutation ([facility] D68.51    Pre-diabetes R73.03    SVT (supraventricular tachycardia) ([facility] I47.1    Pulmonary air embolism ([facility] T79. 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 SH:  Social History   Substance Use Topics    Smoking status: Never Smoker    Smokeless tobacco: Never Used    Alcohol use No       Medications/Allergies:  Current Outpatient Prescriptions on File Prior to Visit   Medication Sig Dispense Refill    cyanocobalamin 1,000 mcg tablet Take 1,000 mcg by mouth daily.  CALCIUM CARBONATE (CALCIUM 500 PO) Take 1,000 mg by mouth daily.  multivitamins chew Take  by mouth daily.  cholecalciferol, VITAMIN D3, (VITAMIN D3) 5,000 unit tab tablet Take  by mouth daily.  enoxaparin (LOVENOX) 40 mg/0.4 mL 40 mg by SubCUTAneous route.  metoprolol succinate (TOPROL-XL) 25 mg XL tablet Take 1 Tab by mouth daily. 90 Tab 1     No current facility-administered medications on file prior to visit.          Allergies   Allergen Reactions    Bee Sting [Sting, Bee] Swelling         Health Maintenance:   Health Maintenance   Topic Date Due    DTaP/Tdap/Td series (1 - Tdap) 11/25/1989    INFLUENZA AGE 9 TO ADULT  08/01/2017    PAP AKA CERVICAL CYTOLOGY  12/15/2017       Objective:  Visit Vitals    /82 (BP 1 Location: Left arm, BP Patient Position: Sitting)    Pulse 74    Temp 97.8 °F (36.6 °C) (Oral)    Resp 18    Ht 5' 5\" (1.651 m)    Wt 178 lb 3.2 oz (80.8 kg)    LMP 08/26/2017    SpO2 98%    BMI 29.65 kg/m2          Nurses notes and VS reviewed. Physical Examination: General appearance - alert, well appearing, and in no distress          Labwork and Ancillary Studies:    CBC w/Diff  Lab Results   Component Value Date/Time    WBC 10.6 08/10/2016 09:53 AM    WBC 8.2 06/20/2012 09:23 AM    HGB 13.5 08/10/2016 09:53 AM    PLATELET 798 50/13/3264 09:53 AM         Basic Metabolic Profile  Lab Results   Component Value Date/Time    Sodium 140 08/10/2016 09:53 AM    Potassium 4.8 08/10/2016 09:53 AM    Chloride 106 08/10/2016 09:53 AM    CO2 27 08/10/2016 09:53 AM    Anion gap 7 08/10/2016 09:53 AM    Glucose 91 08/10/2016 09:53 AM    BUN 10 08/10/2016 09:53 AM    Creatinine 0.68 08/10/2016 09:53 AM    BUN/Creatinine ratio 15 08/10/2016 09:53 AM    GFR est AA >60 08/10/2016 09:53 AM    GFR est non-AA >60 08/10/2016 09:53 AM    Calcium 9.2 08/10/2016 09:53 AM         LFT  Lab Results   Component Value Date/Time    ALT (SGPT) 34 08/10/2016 09:53 AM    AST (SGOT) 17 08/10/2016 09:53 AM    Alk.  phosphatase 96 08/10/2016 09:53 AM    Bilirubin, total 0.3 08/10/2016 09:53 AM         Cholesterol  Lab Results   Component Value Date/Time    Cholesterol, total 220 08/10/2016 09:53 AM    HDL Cholesterol 44 08/10/2016 09:53 AM    LDL, calculated 139.4 08/10/2016 09:53 AM    Triglyceride 183 08/10/2016 09:53 AM    CHOL/HDL Ratio 5.0 08/10/2016 09:53 AM

## 2017-09-22 LAB
BACTERIA SPEC CULT: NORMAL
SERVICE CMNT-IMP: NORMAL

## 2018-01-25 ENCOUNTER — OFFICE VISIT (OUTPATIENT)
Dept: FAMILY MEDICINE CLINIC | Age: 48
End: 2018-01-25

## 2018-01-25 VITALS
BODY MASS INDEX: 26.76 KG/M2 | HEART RATE: 94 BPM | TEMPERATURE: 97.8 F | DIASTOLIC BLOOD PRESSURE: 76 MMHG | OXYGEN SATURATION: 96 % | HEIGHT: 65 IN | SYSTOLIC BLOOD PRESSURE: 131 MMHG | RESPIRATION RATE: 20 BRPM | WEIGHT: 160.6 LBS

## 2018-01-25 DIAGNOSIS — H92.01 RIGHT EAR PAIN: Primary | ICD-10-CM

## 2018-01-25 NOTE — PROGRESS NOTES
Jennifer Billingsley is a 50 y.o. female (: 1970) presenting to address:    Chief Complaint   Patient presents with    Ear Pain       Vitals:    18 0949   BP: 131/76   Pulse: 94   Resp: 20   Temp: 97.8 °F (36.6 °C)   TempSrc: Oral   SpO2: 96%   Weight: 160 lb 9.6 oz (72.8 kg)   Height: 5' 5\" (1.651 m)   PainSc:   3   PainLoc: Ear   LMP: 2018       Hearing/Vision:   No exam data present    Learning Assessment:     Learning Assessment 2015   PRIMARY LEARNER Patient   HIGHEST LEVEL OF EDUCATION - PRIMARY LEARNER  2 YEARS OF COLLEGE   BARRIERS PRIMARY LEARNER NONE   CO-LEARNER CAREGIVER No   PRIMARY LANGUAGE ENGLISH    NEED No   LEARNER PREFERENCE PRIMARY READING   LEARNING SPECIAL TOPICS none   ANSWERED BY patient   RELATIONSHIP SELF   ASSESSMENT COMMENT n/a     Depression Screening:     PHQ over the last two weeks 2018   Little interest or pleasure in doing things Not at all   Feeling down, depressed or hopeless Not at all   Total Score PHQ 2 0     Fall Risk Assessment:   No flowsheet data found. Abuse Screening:     Abuse Screening Questionnaire 8/3/2017   Do you ever feel afraid of your partner? N   Are you in a relationship with someone who physically or mentally threatens you? N   Is it safe for you to go home? Y     Coordination of Care Questionaire:   1. Have you been to the ER, urgent care clinic since your last visit? Hospitalized since your last visit? YES [facility] er    2. Have you seen or consulted any other health care providers outside of the [address] [place] [address] since your last visit? Include any pap smears or colon screening. YES urology    Advanced Directive:   1. Do you have an Advanced Directive? NO    2. Would you like information on Advanced Directives?  NO

## 2018-01-25 NOTE — PROGRESS NOTES
HISTORY OF PRESENT ILLNESS  Greg Joe is a 50 y.o. female. HPI  C/o right ear pain for 2 days, maily at night, no pain during the day, no discharge  Review of Systems   Constitutional: Negative for chills, diaphoresis and fever. HENT: Negative for ear discharge and sore throat. Respiratory: Negative for cough. Cardiovascular: Negative for chest pain. Skin: Negative for rash. Physical Exam   HENT:   Right Ear: Hearing, tympanic membrane and external ear normal. Tympanic membrane is not injected. Left Ear: Hearing, tympanic membrane and external ear normal. Tympanic membrane is not injected. Nose: Nose normal.   Mouth/Throat: Oropharynx is clear and moist. No oropharyngeal exudate. Arlene and Storm tests are normal   Cardiovascular: Normal rate, regular rhythm and normal heart sounds. Pulmonary/Chest: Effort normal and breath sounds normal.   Lymphadenopathy:     She has no cervical adenopathy. Vitals reviewed. ASSESSMENT and PLAN  Diagnoses and all orders for this visit:    1. Right ear pain, ?  Etiology  Tylenol 1000 mg po TID, pt can not take NSAID due to gastric bypass surgery  To ENT if no better

## 2018-06-19 ENCOUNTER — OFFICE VISIT (OUTPATIENT)
Dept: FAMILY MEDICINE CLINIC | Age: 48
End: 2018-06-19

## 2018-06-19 ENCOUNTER — HOSPITAL ENCOUNTER (OUTPATIENT)
Dept: LAB | Age: 48
Discharge: HOME | End: 2018-06-19
Payer: OTHER GOVERNMENT

## 2018-06-19 VITALS
SYSTOLIC BLOOD PRESSURE: 112 MMHG | WEIGHT: 151 LBS | OXYGEN SATURATION: 98 % | BODY MASS INDEX: 24.27 KG/M2 | TEMPERATURE: 98.3 F | HEART RATE: 64 BPM | RESPIRATION RATE: 16 BRPM | HEIGHT: 66 IN | DIASTOLIC BLOOD PRESSURE: 74 MMHG

## 2018-06-19 DIAGNOSIS — F51.01 PRIMARY INSOMNIA: ICD-10-CM

## 2018-06-19 DIAGNOSIS — Z98.84 S/P GASTRIC BYPASS: ICD-10-CM

## 2018-06-19 DIAGNOSIS — I47.1 SVT (SUPRAVENTRICULAR TACHYCARDIA) (HCC): ICD-10-CM

## 2018-06-19 DIAGNOSIS — E55.9 VITAMIN D DEFICIENCY: ICD-10-CM

## 2018-06-19 DIAGNOSIS — D68.51 FACTOR V LEIDEN MUTATION (HCC): ICD-10-CM

## 2018-06-19 DIAGNOSIS — R73.03 PRE-DIABETES: ICD-10-CM

## 2018-06-19 DIAGNOSIS — Z12.39 BREAST CANCER SCREENING: ICD-10-CM

## 2018-06-19 DIAGNOSIS — Z01.419 WELL WOMAN EXAM: ICD-10-CM

## 2018-06-19 DIAGNOSIS — Z00.00 ROUTINE GENERAL MEDICAL EXAMINATION AT A HEALTH CARE FACILITY: Primary | ICD-10-CM

## 2018-06-19 DIAGNOSIS — E78.2 MIXED HYPERLIPIDEMIA: ICD-10-CM

## 2018-06-19 DIAGNOSIS — R53.83 FATIGUE, UNSPECIFIED TYPE: ICD-10-CM

## 2018-06-19 LAB
25(OH)D3 SERPL-MCNC: 41.3 NG/ML (ref 30–100)
ALBUMIN SERPL-MCNC: 4 G/DL (ref 3.4–5)
ALBUMIN/GLOB SERPL: 1.4 {RATIO} (ref 0.8–1.7)
ALP SERPL-CCNC: 86 U/L (ref 45–117)
ALT SERPL-CCNC: 22 U/L (ref 13–56)
ANION GAP SERPL CALC-SCNC: 9 MMOL/L (ref 3–18)
AST SERPL-CCNC: 15 U/L (ref 15–37)
BASOPHILS # BLD: 0 K/UL (ref 0–0.06)
BASOPHILS NFR BLD: 0 % (ref 0–2)
BILIRUB SERPL-MCNC: 0.5 MG/DL (ref 0.2–1)
BUN SERPL-MCNC: 12 MG/DL (ref 7–18)
BUN/CREAT SERPL: 20 (ref 12–20)
CALCIUM SERPL-MCNC: 8.9 MG/DL (ref 8.5–10.1)
CHLORIDE SERPL-SCNC: 107 MMOL/L (ref 100–108)
CHOLEST SERPL-MCNC: 167 MG/DL
CO2 SERPL-SCNC: 26 MMOL/L (ref 21–32)
CREAT SERPL-MCNC: 0.61 MG/DL (ref 0.6–1.3)
DIFFERENTIAL METHOD BLD: NORMAL
EOSINOPHIL # BLD: 0.1 K/UL (ref 0–0.4)
EOSINOPHIL NFR BLD: 2 % (ref 0–5)
ERYTHROCYTE [DISTWIDTH] IN BLOOD BY AUTOMATED COUNT: 13 % (ref 11.6–14.5)
EST. AVERAGE GLUCOSE BLD GHB EST-MCNC: 100 MG/DL
GLOBULIN SER CALC-MCNC: 2.9 G/DL (ref 2–4)
GLUCOSE SERPL-MCNC: 84 MG/DL (ref 74–99)
HBA1C MFR BLD: 5.1 % (ref 4.2–5.6)
HCT VFR BLD AUTO: 40.2 % (ref 35–45)
HDLC SERPL-MCNC: 59 MG/DL (ref 40–60)
HDLC SERPL: 2.8 {RATIO} (ref 0–5)
HGB BLD-MCNC: 13.2 G/DL (ref 12–16)
IRON SATN MFR SERPL: 33 %
IRON SERPL-MCNC: 102 UG/DL (ref 50–175)
LDLC SERPL CALC-MCNC: 94.4 MG/DL (ref 0–100)
LIPID PROFILE,FLP: NORMAL
LYMPHOCYTES # BLD: 2.5 K/UL (ref 0.9–3.6)
LYMPHOCYTES NFR BLD: 33 % (ref 21–52)
MCH RBC QN AUTO: 30.4 PG (ref 24–34)
MCHC RBC AUTO-ENTMCNC: 32.8 G/DL (ref 31–37)
MCV RBC AUTO: 92.6 FL (ref 74–97)
MONOCYTES # BLD: 0.6 K/UL (ref 0.05–1.2)
MONOCYTES NFR BLD: 8 % (ref 3–10)
NEUTS SEG # BLD: 4.2 K/UL (ref 1.8–8)
NEUTS SEG NFR BLD: 57 % (ref 40–73)
PLATELET # BLD AUTO: 304 K/UL (ref 135–420)
PMV BLD AUTO: 10.4 FL (ref 9.2–11.8)
POTASSIUM SERPL-SCNC: 4.5 MMOL/L (ref 3.5–5.5)
PROT SERPL-MCNC: 6.9 G/DL (ref 6.4–8.2)
RBC # BLD AUTO: 4.34 M/UL (ref 4.2–5.3)
SODIUM SERPL-SCNC: 142 MMOL/L (ref 136–145)
TIBC SERPL-MCNC: 311 UG/DL (ref 250–450)
TRIGL SERPL-MCNC: 68 MG/DL (ref ?–150)
TSH SERPL DL<=0.05 MIU/L-ACNC: 0.9 UIU/ML (ref 0.36–3.74)
VIT B12 SERPL-MCNC: 1664 PG/ML (ref 211–911)
VLDLC SERPL CALC-MCNC: 13.6 MG/DL
WBC # BLD AUTO: 7.5 K/UL (ref 4.6–13.2)

## 2018-06-19 PROCEDURE — 83540 ASSAY OF IRON: CPT | Performed by: INTERNAL MEDICINE

## 2018-06-19 PROCEDURE — 80061 LIPID PANEL: CPT | Performed by: INTERNAL MEDICINE

## 2018-06-19 PROCEDURE — 84443 ASSAY THYROID STIM HORMONE: CPT | Performed by: INTERNAL MEDICINE

## 2018-06-19 PROCEDURE — 82306 VITAMIN D 25 HYDROXY: CPT | Performed by: INTERNAL MEDICINE

## 2018-06-19 PROCEDURE — 82607 VITAMIN B-12: CPT | Performed by: INTERNAL MEDICINE

## 2018-06-19 PROCEDURE — 85025 COMPLETE CBC W/AUTO DIFF WBC: CPT | Performed by: INTERNAL MEDICINE

## 2018-06-19 PROCEDURE — 83036 HEMOGLOBIN GLYCOSYLATED A1C: CPT | Performed by: INTERNAL MEDICINE

## 2018-06-19 PROCEDURE — 36415 COLL VENOUS BLD VENIPUNCTURE: CPT | Performed by: INTERNAL MEDICINE

## 2018-06-19 PROCEDURE — 80053 COMPREHEN METABOLIC PANEL: CPT | Performed by: INTERNAL MEDICINE

## 2018-06-19 NOTE — PROGRESS NOTES
HISTORY OF PRESENT ILLNESS  Elijah Armstrong is a 50 y.o. female. HPI  In for CPE  Need referral for Pap/pelvic  Factor v leiden, stable, followed by Dr Christa Short, Hematology  SVT, stable, no palpitations, no c/p  HLD, improving, she had gastric bypass and lost 87 lbs total so far, will repeat labs    S/p gastric bypass surgery, doing well, lost 9 more lbs, BMI 24    Pre diabetes, stable, will repeat labs    C/o wake up frequently at night, has daytime fatigue,  ? PIA  Review of Systems   Constitutional: Positive for malaise/fatigue. Negative for chills and fever. Eyes: Negative for blurred vision. Respiratory: Negative for cough, shortness of breath and wheezing. Cardiovascular: Negative for chest pain, palpitations, orthopnea and PND. Gastrointestinal: Negative for abdominal pain, diarrhea, heartburn and nausea. Musculoskeletal: Negative for falls. Skin: Negative for rash. Neurological: Negative for dizziness, tingling, weakness and headaches. Psychiatric/Behavioral: Negative for depression. The patient has insomnia. All other systems reviewed and are negative. Physical Exam   Constitutional: She is oriented to person, place, and time. She appears well-developed and well-nourished. No distress. HENT:   Head: Normocephalic and atraumatic. Right Ear: Tympanic membrane normal.   Left Ear: Tympanic membrane normal.   Mouth/Throat: No oropharyngeal exudate. Eyes: Conjunctivae and EOM are normal. Pupils are equal, round, and reactive to light. Neck: Normal range of motion. Neck supple. No JVD present. No thyromegaly present. Cardiovascular: Normal rate, regular rhythm, normal heart sounds and intact distal pulses. No murmur heard. Pulmonary/Chest: Effort normal and breath sounds normal. She has no wheezes. She has no rales. Abdominal: Soft. Bowel sounds are normal. She exhibits no mass. There is no hepatosplenomegaly. There is no tenderness.    Musculoskeletal: Normal range of motion. She exhibits no edema. Lymphadenopathy:     She has no cervical adenopathy. Neurological: She is alert and oriented to person, place, and time. Skin: Skin is warm and dry. No rash noted. She is not diaphoretic. Psychiatric: She has a normal mood and affect. Her behavior is normal. Judgment normal.   Vitals reviewed. ASSESSMENT and PLAN  Diagnoses and all orders for this visit:    1. Routine general medical examination at a health care facility    2. Well woman exam  -     [facility]    3. Factor V Leiden mutation ([facility]    4. SVT (supraventricular tachycardia) ([facility]    5. Mixed hyperlipidemia  -     CBC WITH AUTOMATED DIFF; Future  -     LIPID PANEL; Future  -     METABOLIC PANEL, COMPREHENSIVE; Future  -     TSH 3RD GENERATION; Future    6. Vitamin D deficiency  -     VITAMIN D, 25 HYDROXY; Future    7. Pre-diabetes  -     HEMOGLOBIN A1C WITH EAG; Future  -     TSH 3RD GENERATION; Future    8. Primary insomnia  -     REFERRAL TO NEUROLOGY    9. Fatigue, unspecified type  -     REFERRAL TO NEUROLOGY    10. Breast cancer screening  -     [facility] MAMMO BI SCREENING INCL CAD; Future    11. S/P gastric bypass  -     IRON PROFILE;  Future  -     VITAMIN B12; Future    Lab Results   Component Value Date/Time    Cholesterol, total 220 (H) 08/10/2016 09:53 AM    HDL Cholesterol 44 08/10/2016 09:53 AM    LDL, calculated 139.4 (H) 08/10/2016 09:53 AM    VLDL, calculated 36.6 08/10/2016 09:53 AM    Triglyceride 183 (H) 08/10/2016 09:53 AM    CHOL/HDL Ratio 5.0 08/10/2016 09:53 AM     Lab Results   Component Value Date/Time         rtc 6 mos

## 2018-06-19 NOTE — MR AVS SNAPSHOT
[facility] 
 
 
 [address] [facility] [id number] [phone] Patient: Mick Leon MRN: [id number] :1970 Visit Information Date & Time Provider Department Dept. Phone Encounter #  
 2018  9:30 AM Colette Mckinley, [facility] [phone] [id number] Follow-up Instructions Return in about 6 months (around 2018). Upcoming Health Maintenance Date Due DTaP/Tdap/Td series (1 - Tdap) 1989 PAP AKA CERVICAL CYTOLOGY 12/15/2017 Influenza Age 5 to Adult 2018 Allergies as of 2018  Review Complete On: 2018 By: Colette Mckinley MD  
  
 Severity Noted Reaction Type Reactions Bee Sting [Sting, Bee]  2012    Swelling Current Immunizations  Never Reviewed Name Date Influenza Vaccine 1/15/2013 Not reviewed this visit You Were Diagnosed With   
  
 Codes Comments Routine general medical examination at a health care facility    -  Primary ICD-10-CM: Z00.00 ICD-9-CM: V70.0 Well woman exam     ICD-10-CM: L23.849 ICD-9-CM: V72.31 Factor V Leiden mutation ([facility]     ICD-10-CM: N12.92 
ICD-9-CM: 289.81   
 SVT (supraventricular tachycardia) ([facility]     ICD-10-CM: I47.1 ICD-9-CM: 427.89 Mixed hyperlipidemia     ICD-10-CM: E78.2 ICD-9-CM: 272.2 Vitamin D deficiency     ICD-10-CM: E55.9 ICD-9-CM: 268.9 Pre-diabetes     ICD-10-CM: R73.03 
ICD-9-CM: 790.29 Primary insomnia     ICD-10-CM: F51.01 
ICD-9-CM: 307.42 Fatigue, unspecified type     ICD-10-CM: R53.83 ICD-9-CM: 780.79 Breast cancer screening     ICD-10-CM: Z12.31 
ICD-9-CM: V76.10 S/P gastric bypass     ICD-10-CM: L74.78 ICD-9-CM: V45.86 Vitals BP Pulse Temp Resp Height(growth percentile) Weight(growth percentile) 112/74 64 98.3 °F (36.8 °C) (Oral) 16 5' 6\" (1.676 m) 151 lb (68.5 kg) LMP SpO2 BMI OB Status Smoking Status 05/12/2018 (Approximate) 98% 24.37 kg/m2 Unknown Never Smoker BMI and BSA Data Body Mass Index Body Surface Area  
 24.37 kg/m 2 1.79 m 2 Preferred Pharmacy Pharmacy Name Phone [address] [facility], [address]  [facility] [phone] Your Updated Medication List  
  
   
This list is accurate as of 6/19/18 10:28 AM.  Always use your most recent med list.  
  
  
  
  
 CALCIUM 500 PO Take 1,000 mg by mouth daily. cholecalciferol (VITAMIN D3) 5,000 unit Tab tablet Commonly known as:  VITAMIN D3 Take  by mouth daily. cyanocobalamin 1,000 mcg tablet Take 1,000 mcg by mouth daily. multivitamins Chew Take  by mouth daily. We Performed the Following REFERRAL TO NEUROLOGY [EME10 Custom] Comments: Insomnia, wake up frequently, fatigue, R/O PIA REFERRAL TO OBSTETRICS AND GYNECOLOGY [REF51 Custom] Follow-up Instructions Return in about 6 months (around 12/19/2018). To-Do List   
 06/19/2018 Lab:  CBC WITH AUTOMATED DIFF   
  
 06/19/2018 Lab:  HEMOGLOBIN A1C WITH EAG   
  
 06/19/2018 Lab:  IRON PROFILE   
  
 06/19/2018 Lab:  LIPID PANEL   
  
 06/19/2018 Imaging:  [facility] MAMMO BI SCREENING INCL CAD   
  
 06/19/2018 Lab:  METABOLIC PANEL, COMPREHENSIVE   
  
 06/19/2018 Lab:  TSH 3RD GENERATION   
  
 06/19/2018 Lab:  VITAMIN B12   
  
 06/19/2018 Lab:  VITAMIN D, 25 HYDROXY Referral Information Referral ID Referred By Referred To  
  
 [id number] Ellie Kidd DO   
   [address] [place] [address], [address] [place] [address] Phone: [phone] Fax: [phone] Visits Status Start Date End Date 1 New Request 6/19/18 6/19/19  If your referral has a status of pending review or denied, additional information will be sent to support the outcome of this decision. Referral ID Referred By Referred To  
 [id number] Robbie Urena MD  
   [address] Phone: [phone] Fax: [phone] Visits Status Start Date End Date 1 New Request 6/19/18 6/19/19 If your referral has a status of pending review or denied, additional information will be sent to support the outcome of this decision. Introducing [facility]! Dear Gina Antonio: 
Thank you for requesting a [employer] account. Our records indicate that you already have an active [employer] account. You can access your account anytime at [email or web address] Did you know that you can access your hospital and ER discharge instructions at any time in [employer]? You can also review all of your test results from your hospital stay or ER visit. Additional Information If you have questions, please visit the Frequently Asked Questions section of the [employer] website at [email or web address] Remember, [employer] is NOT to be used for urgent needs. For medical emergencies, dial 911. Now available from your iPhone and Android! Please provide this summary of care documentation to your next provider. Your primary care clinician is listed as Breana Glass. If you have any questions after today's visit, please call [phone].

## 2018-06-19 NOTE — PROGRESS NOTES
Rajinder Leos is a 50 y.o. female (: 1970) presenting to address:    Chief Complaint   Patient presents with    Well Woman       Vitals:    18 0944   BP: 112/74   Pulse: 64   Resp: 16   Temp: 98.3 °F (36.8 °C)   TempSrc: Oral   SpO2: 98%   Weight: 151 lb (68.5 kg)   Height: 5' 6\" (1.676 m)   PainSc:   0 - No pain   LMP: 2018       Hearing/Vision:   No exam data present    Learning Assessment:     Learning Assessment 2015   PRIMARY LEARNER Patient   HIGHEST LEVEL OF EDUCATION - PRIMARY LEARNER  2 YEARS OF COLLEGE   BARRIERS PRIMARY LEARNER NONE   CO-LEARNER CAREGIVER No   PRIMARY LANGUAGE ENGLISH    NEED No   LEARNER PREFERENCE PRIMARY READING   LEARNING SPECIAL TOPICS none   ANSWERED BY patient   RELATIONSHIP SELF   ASSESSMENT COMMENT n/a     Depression Screening:     PHQ over the last two weeks 2018   Little interest or pleasure in doing things Not at all   Feeling down, depressed or hopeless Not at all   Total Score PHQ 2 0     Fall Risk Assessment:   No flowsheet data found. Abuse Screening:     Abuse Screening Questionnaire 8/3/2017   Do you ever feel afraid of your partner? N   Are you in a relationship with someone who physically or mentally threatens you? N   Is it safe for you to go home? Y     Coordination of Care Questionaire:   1. Have you been to the ER, urgent care clinic since your last visit? Hospitalized since your last visit? NO    2. Have you seen or consulted any other health care providers outside of the [address] [place] [address] since your last visit? Include any pap smears or colon screening. NO    Advanced Directive:   1. Do you have an Advanced Directive? NO    2. Would you like information on Advanced Directives?  NO

## 2018-06-21 NOTE — PROGRESS NOTES
Glucose, thyroid, cholesterol,vit D, iron, liver and kidneys are all normal, very good    Vit B 12 level is high, decrease dose to one every other day

## 2018-08-23 DIAGNOSIS — Z12.39 BREAST CANCER SCREENING: ICD-10-CM

## 2018-10-24 DIAGNOSIS — Z99.89 OSA ON CPAP: Primary | ICD-10-CM

## 2018-10-24 DIAGNOSIS — G47.33 OSA ON CPAP: Primary | ICD-10-CM

## 2018-12-04 ENCOUNTER — OFFICE VISIT (OUTPATIENT)
Dept: FAMILY MEDICINE CLINIC | Age: 48
End: 2018-12-04

## 2018-12-04 VITALS
RESPIRATION RATE: 16 BRPM | SYSTOLIC BLOOD PRESSURE: 107 MMHG | WEIGHT: 152 LBS | TEMPERATURE: 96.4 F | DIASTOLIC BLOOD PRESSURE: 66 MMHG | HEART RATE: 92 BPM | HEIGHT: 66 IN | BODY MASS INDEX: 24.43 KG/M2 | OXYGEN SATURATION: 97 %

## 2018-12-04 DIAGNOSIS — E66.01 MORBID OBESITY (HCC): ICD-10-CM

## 2018-12-04 DIAGNOSIS — R50.9 FEVER AND CHILLS: Primary | ICD-10-CM

## 2018-12-04 DIAGNOSIS — I26.99 OTHER PULMONARY EMBOLISM WITHOUT ACUTE COR PULMONALE, UNSPECIFIED CHRONICITY (HCC): ICD-10-CM

## 2018-12-04 LAB
BILIRUB UR QL STRIP: NEGATIVE
GLUCOSE UR-MCNC: NEGATIVE MG/DL
KETONES P FAST UR STRIP-MCNC: NEGATIVE MG/DL
PH UR STRIP: 5.5 [PH] (ref 4.6–8)
PROT UR QL STRIP: NEGATIVE
QUICKVUE INFLUENZA TEST: NEGATIVE
SP GR UR STRIP: 1.03 (ref 1–1.03)
UA UROBILINOGEN AMB POC: NORMAL (ref 0.2–1)
URINALYSIS CLARITY POC: CLEAR
URINALYSIS COLOR POC: YELLOW
URINE BLOOD POC: NORMAL
URINE LEUKOCYTES POC: NEGATIVE
URINE NITRITES POC: NEGATIVE
VALID INTERNAL CONTROL?: NO

## 2018-12-04 NOTE — PATIENT INSTRUCTIONS

## 2018-12-04 NOTE — PROGRESS NOTES
Lupillo Galo is a 46 y.o. female (: 1970) presenting to address:    Chief Complaint   Patient presents with    Fever     and fatigue times two days       Vitals:    18 1405   BP: 107/66   Pulse: 92   Resp: 16   Temp: 96.4 °F (35.8 °C)   TempSrc: Oral   SpO2: 97%   Weight: 152 lb (68.9 kg)   Height: 5' 6\" (1.676 m)   PainSc:   0 - No pain   LMP: 2018       Hearing/Vision:   No exam data present    Learning Assessment:     Learning Assessment 2015   PRIMARY LEARNER Patient   HIGHEST LEVEL OF EDUCATION - PRIMARY LEARNER  2 YEARS OF COLLEGE   BARRIERS PRIMARY LEARNER NONE   CO-LEARNER CAREGIVER No   PRIMARY LANGUAGE ENGLISH    NEED No   LEARNER PREFERENCE PRIMARY READING   LEARNING SPECIAL TOPICS none   ANSWERED BY patient   RELATIONSHIP SELF   ASSESSMENT COMMENT n/a     Depression Screening:     PHQ over the last two weeks 2018   Little interest or pleasure in doing things Not at all   Feeling down, depressed, irritable, or hopeless Not at all   Total Score PHQ 2 0     Fall Risk Assessment:   No flowsheet data found. Abuse Screening:     Abuse Screening Questionnaire 8/3/2017   Do you ever feel afraid of your partner? N   Are you in a relationship with someone who physically or mentally threatens you? N   Is it safe for you to go home? Y     Coordination of Care Questionaire:   1. Have you been to the ER, urgent care clinic since your last visit? Hospitalized since your last visit? NO    2. Have you seen or consulted any other health care providers outside of the [address] [place] [address] since your last visit? Include any pap smears or colon screening. YES  Neurologist for sleep study. Advanced Directive:   1. Do you have an Advanced Directive? NO    2. Would you like information on Advanced Directives?  NO

## 2018-12-04 NOTE — PROGRESS NOTES
HISTORY OF PRESENT ILLNESS  Jah Christian is a 46 y.o. female. HPI  C/o fever/chills and fatigue for 2 days, no sore throat, no ear pain  Review of Systems   HENT: Negative for ear pain and sore throat. Respiratory: Negative for cough. Cardiovascular: Negative for chest pain. Gastrointestinal: Negative for abdominal pain and nausea. Genitourinary: Positive for urgency. Negative for dysuria and frequency. Musculoskeletal: Negative for myalgias. Physical Exam   HENT:   Right Ear: Tympanic membrane and external ear normal.   Left Ear: Tympanic membrane and external ear normal.   Mouth/Throat: Oropharynx is clear and moist. No oropharyngeal exudate. Cardiovascular: Normal rate, regular rhythm and normal heart sounds. Pulmonary/Chest: Effort normal and breath sounds normal. She has no wheezes. She has no rales. Abdominal: Soft. There is no tenderness. Lymphadenopathy:     She has no cervical adenopathy. Vitals reviewed. ASSESSMENT and PLAN  Diagnoses and all orders for this visit:    1. Fever and chills, most likely viral illness  -     AMB POC RAPID INFLUENZA TEST  -     AMB POC URINALYSIS DIP STICK AUTO W/O MICRO    Tylenol ptc prn  Rest, increase fluids  rtc if not better    2.  Other pulmonary embolism without acute cor pulmonale, unspecified chronicity ([address] resolved, she was on coumadin in the past.    3. Morbid obesity ([address] resolved, she had gastric bypass, her BMI is 24.5 now

## 2019-02-06 ENCOUNTER — TELEPHONE (OUTPATIENT)
Dept: FAMILY MEDICINE CLINIC | Age: 49
End: 2019-02-06

## 2019-02-07 DIAGNOSIS — D68.51 FACTOR V LEIDEN MUTATION (HCC): Primary | ICD-10-CM

## 2019-02-11 ENCOUNTER — OFFICE VISIT (OUTPATIENT)
Dept: FAMILY MEDICINE CLINIC | Age: 49
End: 2019-02-11

## 2019-02-11 VITALS
RESPIRATION RATE: 20 BRPM | HEART RATE: 89 BPM | BODY MASS INDEX: 24.91 KG/M2 | DIASTOLIC BLOOD PRESSURE: 60 MMHG | HEIGHT: 66 IN | SYSTOLIC BLOOD PRESSURE: 106 MMHG | TEMPERATURE: 97.3 F | WEIGHT: 155 LBS | OXYGEN SATURATION: 99 %

## 2019-02-11 DIAGNOSIS — I47.1 SVT (SUPRAVENTRICULAR TACHYCARDIA) (HCC): ICD-10-CM

## 2019-02-11 DIAGNOSIS — B34.9 VIRAL ILLNESS: Primary | ICD-10-CM

## 2019-02-11 DIAGNOSIS — D68.51 FACTOR V LEIDEN MUTATION (HCC): ICD-10-CM

## 2019-02-11 LAB
FLUAV+FLUBV AG NOSE QL IA.RAPID: NEGATIVE POS/NEG
FLUAV+FLUBV AG NOSE QL IA.RAPID: NEGATIVE POS/NEG
VALID INTERNAL CONTROL?: YES

## 2019-02-11 RX ORDER — BENZONATATE 100 MG/1
100-200 CAPSULE ORAL
Qty: 30 CAP | Refills: 0 | Status: SHIPPED | OUTPATIENT
Start: 2019-02-11 | End: 2019-05-31 | Stop reason: ALTCHOICE

## 2019-02-11 RX ORDER — TRAZODONE HYDROCHLORIDE 50 MG/1
TABLET ORAL
COMMUNITY
End: 2021-08-19

## 2019-02-11 NOTE — PATIENT INSTRUCTIONS

## 2019-02-11 NOTE — PROGRESS NOTES
Balbir Castelan is a 46 y.o. female (: 1970) presenting to address: Chief Complaint Patient presents with  Cold Symptoms Vitals:  
 19 1353 BP: 106/60 Pulse: 89 Resp: 20 Temp: 97.3 °F (36.3 °C) TempSrc: Oral  
SpO2: 99% Weight: 155 lb (70.3 kg) Height: 5' 6\" (1.676 m) PainSc:   0 - No pain Learning Assessment:  
 
Learning Assessment 2015 PRIMARY LEARNER Patient HIGHEST LEVEL OF EDUCATION - PRIMARY LEARNER  2 YEARS OF COLLEGE  
BARRIERS PRIMARY LEARNER NONE  
CO-LEARNER CAREGIVER No  
PRIMARY LANGUAGE ENGLISH  NEED No  
LEARNER PREFERENCE PRIMARY READING  
LEARNING SPECIAL TOPICS none ANSWERED BY patient RELATIONSHIP SELF  
ASSESSMENT COMMENT n/a Depression Screening: PHQ over the last two weeks 2019 Little interest or pleasure in doing things Not at all Feeling down, depressed, irritable, or hopeless Not at all Total Score PHQ 2 0 Fall Risk Assessment:  
 
Fall Risk Assessment, last 12 mths 2019 Able to walk? Yes Fall in past 12 months? No  
 
Abuse Screening:  
 
Abuse Screening Questionnaire 2019 Do you ever feel afraid of your partner? Lisa Lower Are you in a relationship with someone who physically or mentally threatens you? Lisa Lower Is it safe for you to go home? Fred Sparks Coordination of Care Questionaire: 1. Have you been to the ER, urgent care clinic since your last visit? Hospitalized since your last visit? NO 
 
2. Have you seen or consulted any other health care providers outside of the [address] [place] [address] since your last visit? Include any pap smears or colon screening. YES gastroenterology 19 Advanced Directive: 1. Do you have an Advanced Directive? NO 
 
2. Would you like information on Advanced Directives? NO patient refused

## 2019-02-11 NOTE — PROGRESS NOTES
HISTORY OF PRESENT ILLNESS Peri Radford is a 46 y.o. female. HPI 
C/o fatigue/congestion/sore throat and dry cough, started yesterday Review of Systems Constitutional: Negative for chills and fever. HENT: Negative for ear pain. Respiratory: Negative for sputum production. Physical Exam  
HENT:  
Right Ear: Tympanic membrane normal.  
Left Ear: Tympanic membrane normal.  
Nose: Nose normal.  
Mouth/Throat: No oropharyngeal exudate. Cardiovascular: Normal rate, regular rhythm and normal heart sounds. Pulmonary/Chest: Effort normal and breath sounds normal. She has no wheezes. She has no rales. Lymphadenopathy:  
  She has no cervical adenopathy. Vitals reviewed. ASSESSMENT and PLAN Diagnoses and all orders for this visit: 1. Viral illness -     AMB POC ROLANDA INFLUENZA A/B TEST , negative 
-     benzonatate (TESSALON) 100 mg capsule; Take 1-2 Caps by mouth three (3) times daily as needed for Cough. - Tylenol prn Follow up if not better 2. SVT (supraventricular tachycardia) ([facility] stable, followed by Cardiology @ [address] 3. [facility] stable, followed by Hematology

## 2019-05-31 ENCOUNTER — HOSPITAL ENCOUNTER (OUTPATIENT)
Dept: LAB | Age: 49
Discharge: HOME | End: 2019-05-31
Payer: OTHER GOVERNMENT

## 2019-05-31 ENCOUNTER — OFFICE VISIT (OUTPATIENT)
Dept: FAMILY MEDICINE CLINIC | Age: 49
End: 2019-05-31

## 2019-05-31 VITALS
TEMPERATURE: 96.4 F | HEART RATE: 68 BPM | RESPIRATION RATE: 18 BRPM | HEIGHT: 66 IN | SYSTOLIC BLOOD PRESSURE: 112 MMHG | WEIGHT: 157.4 LBS | BODY MASS INDEX: 25.3 KG/M2 | DIASTOLIC BLOOD PRESSURE: 57 MMHG | OXYGEN SATURATION: 100 %

## 2019-05-31 DIAGNOSIS — R30.0 DYSURIA: Primary | ICD-10-CM

## 2019-05-31 DIAGNOSIS — R30.0 DYSURIA: ICD-10-CM

## 2019-05-31 LAB
BILIRUB UR QL STRIP: NEGATIVE
GLUCOSE UR-MCNC: NEGATIVE MG/DL
KETONES P FAST UR STRIP-MCNC: NEGATIVE MG/DL
PH UR STRIP: 6 [PH] (ref 4.6–8)
PROT UR QL STRIP: NEGATIVE
SP GR UR STRIP: 1 (ref 1–1.03)
UA UROBILINOGEN AMB POC: NORMAL (ref 0.2–1)
URINALYSIS CLARITY POC: CLEAR
URINALYSIS COLOR POC: YELLOW
URINE BLOOD POC: NEGATIVE
URINE LEUKOCYTES POC: NEGATIVE
URINE NITRITES POC: NEGATIVE

## 2019-05-31 PROCEDURE — 87077 CULTURE AEROBIC IDENTIFY: CPT

## 2019-05-31 PROCEDURE — 87086 URINE CULTURE/COLONY COUNT: CPT

## 2019-05-31 PROCEDURE — 87186 SC STD MICRODIL/AGAR DIL: CPT

## 2019-05-31 RX ORDER — NITROFURANTOIN 25; 75 MG/1; MG/1
100 CAPSULE ORAL 2 TIMES DAILY
Qty: 10 CAP | Refills: 0 | Status: SHIPPED | OUTPATIENT
Start: 2019-05-31 | End: 2019-06-05

## 2019-05-31 NOTE — PROGRESS NOTES
Ganesh Millan is a 46 y.o. female (: 1970) presenting to address:    Chief Complaint   Patient presents with    Urgency       Vitals:    19 1401   BP: 112/57   Pulse: 68   Resp: 18   Temp: 96.4 °F (35.8 °C)   TempSrc: Oral   SpO2: 100%   Weight: 157 lb 6.4 oz (71.4 kg)   Height: 5' 6\" (1.676 m)   PainSc:   0 - No pain       Learning Assessment:     Learning Assessment 2015   PRIMARY LEARNER Patient   HIGHEST LEVEL OF EDUCATION - PRIMARY LEARNER  2 YEARS OF COLLEGE   BARRIERS PRIMARY LEARNER NONE   CO-LEARNER CAREGIVER No   PRIMARY LANGUAGE ENGLISH    NEED No   LEARNER PREFERENCE PRIMARY READING   LEARNING SPECIAL TOPICS none   ANSWERED BY patient   RELATIONSHIP SELF   ASSESSMENT COMMENT n/a     Depression Screening:     3 most recent PHQ Screens 2019   Little interest or pleasure in doing things Not at all   Feeling down, depressed, irritable, or hopeless Not at all   Total Score PHQ 2 0     Fall Risk Assessment:     Fall Risk Assessment, last 12 mths 2019   Able to walk? Yes   Fall in past 12 months? No     Abuse Screening:     Abuse Screening Questionnaire 2019   Do you ever feel afraid of your partner? N   Are you in a relationship with someone who physically or mentally threatens you? N   Is it safe for you to go home? Y     Coordination of Care Questionaire:   1. Have you been to the ER, urgent care clinic since your last visit? Hospitalized since your last visit? NO    2. Have you seen or consulted any other health care providers outside of the [address] [place] [address] since your last visit? Include any pap smears or colon screening. NO    Advanced Directive:   1. Do you have an Advanced Directive? YES    2. Would you like information on Advanced Directives?  NO

## 2019-05-31 NOTE — PROGRESS NOTES
HISTORY OF PRESENT ILLNESS  Edith Merino is a 46 y.o. female. HPI  C/o started 3 days ago with urinary urgency and dysuria, she has been drinking plenty of water, feels better today  Review of Systems   Constitutional: Negative for chills and fever. Gastrointestinal: Negative for abdominal pain. Genitourinary: Negative for flank pain and hematuria. Physical Exam   Abdominal: Soft. Bowel sounds are normal. There is no tenderness. There is no CVA tenderness. Vitals reviewed. ASSESSMENT and PLAN  Diagnoses and all orders for this visit:    1. Dysuria  -     AMB POC URINALYSIS DIP STICK AUTO W/O MICRO, clear  -     CULTURE, URINE; Future  - will wait for the urine culture, she will start macrobid only if symptoms return, she was given printed Rx to use if she gets worse, will call her with culture results once available   -     nitrofurantoin, macrocrystal-monohydrate, (MACROBID) 100 mg capsule; Take 1 Cap by mouth two (2) times a day for 5 days.

## 2019-06-02 LAB
BACTERIA SPEC CULT: ABNORMAL
BACTERIA SPEC CULT: ABNORMAL
SERVICE CMNT-IMP: ABNORMAL

## 2019-06-02 NOTE — PROGRESS NOTES
Culture was positive for E coli, (and also mixed skin caroline from contamination with skin)  Please take the [facility] Rx that was given

## 2019-07-23 ENCOUNTER — OFFICE VISIT (OUTPATIENT)
Dept: FAMILY MEDICINE CLINIC | Age: 49
End: 2019-07-23

## 2019-07-23 VITALS
TEMPERATURE: 97.7 F | BODY MASS INDEX: 25.49 KG/M2 | HEART RATE: 64 BPM | HEIGHT: 66 IN | RESPIRATION RATE: 14 BRPM | WEIGHT: 158.6 LBS | SYSTOLIC BLOOD PRESSURE: 108 MMHG | OXYGEN SATURATION: 96 % | DIASTOLIC BLOOD PRESSURE: 57 MMHG

## 2019-07-23 DIAGNOSIS — M75.42 IMPINGEMENT SYNDROME OF LEFT SHOULDER: Primary | ICD-10-CM

## 2019-07-23 RX ORDER — PREDNISONE 20 MG/1
20 TABLET ORAL 2 TIMES DAILY WITH MEALS
Qty: 10 TAB | Refills: 0 | Status: SHIPPED | OUTPATIENT
Start: 2019-07-23 | End: 2019-08-07

## 2019-07-23 NOTE — PROGRESS NOTES
Daly Montes is a 46 y.o. female (: 1970) presenting to address:    Chief Complaint   Patient presents with    Shoulder Pain     Left        Vitals:    19 0942   BP: 108/57   Pulse: 64   Resp: 14   Temp: 97.7 °F (36.5 °C)   TempSrc: Oral   SpO2: 96%   Weight: 158 lb 9.6 oz (71.9 kg)   Height: 5' 6\" (1.676 m)   PainSc:   0 - No pain       Hearing/Vision:   No exam data present    Learning Assessment:     Learning Assessment 2015   PRIMARY LEARNER Patient   HIGHEST LEVEL OF EDUCATION - PRIMARY LEARNER  2 YEARS OF COLLEGE   BARRIERS PRIMARY LEARNER NONE   CO-LEARNER CAREGIVER No   PRIMARY LANGUAGE ENGLISH    NEED No   LEARNER PREFERENCE PRIMARY READING   LEARNING SPECIAL TOPICS none   ANSWERED BY patient   RELATIONSHIP SELF   ASSESSMENT COMMENT n/a     Depression Screening:     3 most recent PHQ Screens 2019   Little interest or pleasure in doing things Not at all   Feeling down, depressed, irritable, or hopeless Not at all   Total Score PHQ 2 0     Fall Risk Assessment:     Fall Risk Assessment, last 12 mths 2019   Able to walk? Yes   Fall in past 12 months? No     Abuse Screening:     Abuse Screening Questionnaire 2019   Do you ever feel afraid of your partner? N   Are you in a relationship with someone who physically or mentally threatens you? N   Is it safe for you to go home? Y     Coordination of Care Questionaire:   1. Have you been to the ER, urgent care clinic since your last visit? Hospitalized since your last visit? NO    2. Have you seen or consulted any other health care providers outside of the [address] [place] [address] since your last visit? Include any pap smears or colon screening. NO    Advanced Directive:   1. Do you have an Advanced Directive? NO    2. Would you like information on Advanced Directives?  NO

## 2019-07-23 NOTE — PROGRESS NOTES
HISTORY OF PRESENT ILLNESS  Daly Montes is a 46 y.o. female. HPI  C/o left shoulder pain for 2 weeks, on/off, worse when he lift her arm above her shoulder, no recent trauma, no fall  Review of Systems   Neurological: Negative for tingling and focal weakness. Physical Exam   Cardiovascular: Normal rate and regular rhythm. Pulmonary/Chest: Effort normal and breath sounds normal.   Musculoskeletal:        Left shoulder: She exhibits tenderness (anterolateral side, empty can sign positive, abduction above 90 degrees cause pain). She exhibits normal range of motion. Vitals reviewed. ASSESSMENT and PLAN  Diagnoses and all orders for this visit:    1. Impingement syndrome of left shoulder  -     predniSONE (DELTASONE) 20 mg tablet; Take 20 mg by mouth two (2) times daily (with meals).   -     XR SHOULDER LT AP/LAT MIN 2 V; Future    home exercises given  rtc 2 weeks if not better

## 2019-07-23 NOTE — PATIENT INSTRUCTIONS
Rotator Cuff: Exercises  Introduction  Here are some examples of exercises for you to try. The exercises may be suggested for a condition or for rehabilitation. Start each exercise slowly. Ease off the exercises if you start to have pain. You will be told when to start these exercises and which ones will work best for you. How to do the exercises  Pendulum swing    1. Hold on to a table or the back of a chair with your good arm. Then bend forward a little and let your sore arm hang straight down. This exercise does not use the arm muscles. Rather, use your legs and your hips to create movement that makes your arm swing freely. 2. Use the movement from your hips and legs to guide the slightly swinging arm back and forth like a pendulum (or elephant trunk). Then guide it in circles that start small (about the size of a dinner plate). Make the circles a bit larger each day, as your pain allows. 3. Do this exercise for 5 minutes, 5 to 7 times each day. 4. As you have less pain, try bending over a little farther to do this exercise. This will increase the amount of movement at your shoulder. Posterior stretching exercise    1. Hold the elbow of your injured arm with your other hand. 2. Use your hand to pull your injured arm gently up and across your body. You will feel a gentle stretch across the back of your injured shoulder. 3. Hold for at least 15 to 30 seconds. Then slowly lower your arm. 4. Repeat 2 to 4 times. Up-the-back stretch    1. Put your hand in your back pocket. Let it rest there to stretch your shoulder. 2. With your other hand, hold your injured arm (palm outward) behind your back by the wrist. Pull your arm up gently to stretch your shoulder. 3. Next, put a towel over your other shoulder. Put the hand of your injured arm behind your back. Now hold the back end of the towel. With the other hand, hold the front end of the towel in front of your body.  Pull gently on the front end of the towel. This will bring your hand farther up your back to stretch your shoulder. Overhead stretch    1. Standing about an arm's length away, grasp onto a solid surface. You could use a countertop, a doorknob, or the back of a sturdy chair. 2. With your knees slightly bent, bend forward with your arms straight. Lower your upper body, and let your shoulders stretch. 3. As your shoulders are able to stretch farther, you may need to take a step or two backward. 4. Hold for at least 15 to 30 seconds. Then stand up and relax. If you had stepped back during your stretch, step forward so you can keep your hands on the solid surface. 5. Repeat 2 to 4 times. Shoulder flexion (lying down)    1. Lie on your back, holding a wand with both hands. Your palms should face down as you hold the wand. 2. Keeping your elbows straight, slowly raise your arms over your head. Raise them until you feel a stretch in your shoulders, upper back, and chest.  3. Hold for 15 to 30 seconds. 4. Repeat 2 to 4 times. Shoulder rotation (lying down)    1. Lie on your back. Hold a wand with both hands with your elbows bent and palms up. 2. Keep your elbows close to your body, and move the wand across your body toward the sore arm. 3. Hold for 8 to 12 seconds. 4. Repeat 2 to 4 times. Wall climbing (to the side)    1. Stand with your side to a wall so that your fingers can just touch it at an angle about 30 degrees toward the front of your body. 2. Walk the fingers of your injured arm up the wall as high as pain permits. Try not to shrug your shoulder up toward your ear as you move your arm up. 3. Hold that position for a count of at least 15 to 20.  4. Walk your fingers back down to the starting position. 5. Repeat at least 2 to 4 times. Try to reach higher each time. Wall climbing (to the front)    1. Face a wall, and stand so your fingers can just touch it.   2. Keeping your shoulder down, walk the fingers of your injured arm up the wall as high as pain permits. (Don't shrug your shoulder up toward your ear.)  3. Hold your arm in that position for at least 15 to 30 seconds. 4. Slowly walk your fingers back down to where you started. 5. Repeat at least 2 to 4 times. Try to reach higher each time. Shoulder blade squeeze    1. Stand with your arms at your sides, and squeeze your shoulder blades together. Do not raise your shoulders up as you squeeze. 2. Hold 6 seconds. 3. Repeat 8 to 12 times. Scapular exercise: Arm reach    1. Lie flat on your back. This exercise is a very slight motion that starts with your arms raised (elbows straight, arms straight). 2. From this position, reach higher toward the michael or ceiling. Keep your elbows straight. All motion should be from your shoulder blade only. 3. Relax your arms back to where you started. 4. Repeat 8 to 12 times. Arm raise to the side    1. Slowly raise your injured arm to the side, with your thumb facing up. Raise your arm 60 degrees at the most (shoulder level is 90 degrees). 2. Hold the position for 3 to 5 seconds. Then lower your arm back to your side. If you need to, bring your \"good\" arm across your body and place it under the elbow as you lower your injured arm. Use your good arm to keep your injured arm from dropping down too fast.  3. Repeat 8 to 12 times. 4. When you first start out, don't hold any extra weight in your hand. As you get stronger, you may use a 1-pound to 2-pound dumbbell or a small can of food. Shoulder flexor and extensor exercise    1. Push forward (flex): Stand facing a wall or doorjamb, about 6 inches or less back. Hold your injured arm against your body. Make a closed fist with your thumb on top. Then gently push your hand forward into the wall with about 25% to 50% of your strength. Don't let your body move backward as you push. Hold for about 6 seconds. Relax for a few seconds. Repeat 8 to 12 times.   2. Push backward (extend): Stand with your back flat against a wall. Your upper arm should be against the wall, with your elbow bent 90 degrees (your hand straight ahead). Push your elbow gently back against the wall with about 25% to 50% of your strength. Don't let your body move forward as you push. Hold for about 6 seconds. Relax for a few seconds. Repeat 8 to 12 times. Scapular exercise: Wall push-ups    1. Stand facing a wall, about 12 inches to 18 inches away. 2. Place your hands on the wall at shoulder height. 3. Slowly bend your elbows and bring your face to the wall. Keep your back and hips straight. 4. Push back to where you started. 5. Repeat 8 to 12 times. 6. When you can do this exercise against a wall comfortably, you can try it against a counter. You can then slowly progress to the end of a couch, then to a sturdy chair, and finally to the floor. Scapular exercise: Retraction    1. Put the band around a solid object at about waist level. (A bedpost will work well.) Each hand should hold an end of the band. 2. With your elbows at your sides and bent to 90 degrees, pull the band back. Your shoulder blades should move toward each other. Then move your arms back where you started. 3. Repeat 8 to 12 times. 4. If you have good range of motion in your shoulders, try this exercise with your arms lifted out to the sides. Keep your elbows at a 90-degree angle. Raise the elastic band up to about shoulder level. Pull the band back to move your shoulder blades toward each other. Then move your arms back where you started. Internal rotator strengthening exercise    1. Start by tying a piece of elastic exercise material to a doorknob. You can use surgical tubing or Thera-Band. 2. Stand or sit with your shoulder relaxed and your elbow bent 90 degrees. Your upper arm should rest comfortably against your side. Squeeze a rolled towel between your elbow and your body for comfort. This will help keep your arm at your side.   3. Hold one end of the elastic band in the hand of the painful arm. 4. Slowly rotate your forearm toward your body until it touches your belly. Slowly move it back to where you started. 5. Keep your elbow and upper arm firmly tucked against the towel roll or at your side. 6. Repeat 8 to 12 times. External rotator strengthening exercise    1. Start by tying a piece of elastic exercise material to a doorknob. You can use surgical tubing or Thera-Band. (You may also hold one end of the band in each hand.)  2. Stand or sit with your shoulder relaxed and your elbow bent 90 degrees. Your upper arm should rest comfortably against your side. Squeeze a rolled towel between your elbow and your body for comfort. This will help keep your arm at your side. 3. Hold one end of the elastic band with the hand of the painful arm. 4. Start with your forearm across your belly. Slowly rotate the forearm out away from your body. Keep your elbow and upper arm tucked against the towel roll or the side of your body until you begin to feel tightness in your shoulder. Slowly move your arm back to where you started. 5. Repeat 8 to 12 times. Follow-up care is a key part of your treatment and safety. Be sure to make and go to all appointments, and call your doctor if you are having problems. It's also a good idea to know your test results and keep a list of the medicines you take. Where can you learn more? Go to http://ishmael-megan.info/. Enter Daly Morales in the search box to learn more about \"Rotator Cuff: Exercises. \"  Current as of: September 20, 2018  Content Version: 12.1  © 9858-6555 [employer]. Care instructions adapted under license by [employer] (which disclaims liability or warranty for this information).  If you have questions about a medical condition or this instruction, always ask your healthcare professional. [address] any warranty or liability for your use of this information. Rotator Cuff Problems: Care Instructions  Your Care Instructions  The rotator cuff is a group of tendons and muscles around the shoulder that keeps the shoulder joint stable and allows you to raise and rotate your arm. Over time, daily wear and exercise can cause the tendons to rub on the bones of your shoulder. This is called impingement. This condition may cause the tendons to bruise, degenerate, or tear. In many people, these problems do not cause pain. When they do cause pain, you can use rest, physical therapy, ice and heat, and anti-inflammatory medicine to reduce pain and swelling. If you still have pain after trying these treatments, you and your doctor can discuss having a steroid injection or surgery. Follow-up care is a key part of your treatment and safety. Be sure to make and go to all appointments, and call your doctor if you are having problems. It's also a good idea to know your test results and keep a list of the medicines you take. How can you care for yourself at home? · Be safe with medicines. Read and follow all instructions on the label. ? If the doctor gave you a prescription medicine for pain, take it as prescribed. ? If you are not taking a prescription pain medicine, ask your doctor if you can take an over-the-counter medicine. · Put ice or a cold pack on your shoulder for 10 to 20 minutes at a time. Try to do this every 1 to 2 hours for the next 3 days (when you are awake). Put a thin cloth between the ice pack and your skin. · After 3 days, put a warm, wet towel on your shoulder. This is to relax the muscles and increase blood flow. While holding the towel on your shoulder, lean forward so your arm hangs freely, and gently swing your arm back and forth like a pendulum. You also can do this standing under a warm shower. · Follow your doctor's advice for physical therapy. When your doctor says it is okay, try these stretching exercises.  Do them slowly to avoid injury. Put a warm, wet towel on your shoulder before exercising. Stop any exercise that increases pain. ? Range-of-motion exercises. If it is not too painful, stretch your arm in four directions: across the body, up the back, to the side, and overhead. ? Pendulum exercise. Lean forward and hold onto a table or the back of a chair with your good arm. Bend at the waist, letting the arm with the sore shoulder hang straight down. Swing your arm back and forth like a pendulum, then in circles that start small and slowly grow larger. This exercise does not use the arm muscles. Instead, use your legs and your hips to create movement that makes your arm swing freely. Try this for about 5 minutes, several times a day. ? Wall climbing (to the side). Stand with your side to a wall so that your fingers can just touch it. Then turn so your body is turned slightly toward the wall. Walk the fingers of your injured arm up the wall as high as pain permits. Try not to shrug your shoulder up toward your ear as you move your arm up. Hold that position for a count of 15 to 30 seconds. Walk your fingers down to the starting position. Repeat 2 to 4 times, trying to reach higher each time. ? Wall climbing (to the front). Face a wall, standing so your fingers can just touch it. Walk the fingers of your affected arm up the wall as high as pain permits. Try not to shrug your shoulder up toward your ear as you move your arm up. Hold that position for a count of 15 to 30 seconds. Slowly walk your fingers to the starting position. Repeat 2 to 4 times, trying to reach higher each time. · Rest your shoulder when you are not doing stretches and other exercises. Your doctor may tell you to wait for the pain to go away before doing exercises. Do not lift heavy bags of groceries, play sports, or do anything else that makes you twist or stress your shoulder. Avoid activities where you move your affected arm above your head.   When should you call for help? Call your doctor now or seek immediate medical care if:    · You have severe pain.     · You cannot move your shoulder or arm.     · You have tingling or numbness in your arm or hand.     · Your arm or hand is cool or pale.    Watch closely for changes in your health, and be sure to contact your doctor if:    · Your pain gets worse.     · You have new or worse swelling in your arm or hand.     · You do not get better as expected. Where can you learn more? Go to http://ishmael-megan.info/. Enter E207 in the search box to learn more about \"Rotator Cuff Problems: Care Instructions. \"  Current as of: September 20, 2018  Content Version: 12.1  © 3412-1578 Healthwise, Incorporated. Care instructions adapted under license by [employer] (which disclaims liability or warranty for this information). If you have questions about a medical condition or this instruction, always ask your healthcare professional. [address] any warranty or liability for your use of this information.

## 2019-08-07 ENCOUNTER — OFFICE VISIT (OUTPATIENT)
Dept: FAMILY MEDICINE CLINIC | Age: 49
End: 2019-08-07

## 2019-08-07 VITALS
DIASTOLIC BLOOD PRESSURE: 60 MMHG | BODY MASS INDEX: 25.78 KG/M2 | SYSTOLIC BLOOD PRESSURE: 112 MMHG | TEMPERATURE: 98.2 F | OXYGEN SATURATION: 98 % | HEIGHT: 66 IN | RESPIRATION RATE: 16 BRPM | WEIGHT: 160.4 LBS | HEART RATE: 85 BPM

## 2019-08-07 DIAGNOSIS — M75.42 IMPINGEMENT SYNDROME OF LEFT SHOULDER: Primary | ICD-10-CM

## 2019-08-07 NOTE — PROGRESS NOTES
Wilbert Patterson is a 46 y.o. female (: 1970) presenting to address:    Chief Complaint   Patient presents with    Injection       Vitals:    19 1433   BP: 112/60   Pulse: 85   Resp: 16   Temp: 98.2 °F (36.8 °C)   TempSrc: Oral   SpO2: 98%   Weight: 160 lb 6.4 oz (72.8 kg)   Height: 5' 6\" (1.676 m)   PainSc:   0 - No pain       Hearing/Vision:   No exam data present    Learning Assessment:     Learning Assessment 2015   PRIMARY LEARNER Patient   HIGHEST LEVEL OF EDUCATION - PRIMARY LEARNER  2 YEARS OF COLLEGE   BARRIERS PRIMARY LEARNER NONE   CO-LEARNER CAREGIVER No   PRIMARY LANGUAGE ENGLISH    NEED No   LEARNER PREFERENCE PRIMARY READING   LEARNING SPECIAL TOPICS none   ANSWERED BY patient   RELATIONSHIP SELF   ASSESSMENT COMMENT n/a     Depression Screening:     3 most recent PHQ Screens 2019   Little interest or pleasure in doing things Not at all   Feeling down, depressed, irritable, or hopeless Not at all   Total Score PHQ 2 0     Fall Risk Assessment:     Fall Risk Assessment, last 12 mths 2019   Able to walk? Yes   Fall in past 12 months? No     Abuse Screening:     Abuse Screening Questionnaire 2019   Do you ever feel afraid of your partner? N   Are you in a relationship with someone who physically or mentally threatens you? N   Is it safe for you to go home? Y     Coordination of Care Questionaire:   1. Have you been to the ER, urgent care clinic since your last visit? Hospitalized since your last visit? NO    2. Have you seen or consulted any other health care providers outside of the [address] [place] [address] since your last visit? Include any pap smears or colon screening. NO    Advanced Directive:   1. Do you have an Advanced Directive? NO    2. Would you like information on Advanced Directives?  YES

## 2019-08-07 NOTE — PROGRESS NOTES
HISTORY OF PRESENT ILLNESS  Onur Field is a 46 y.o. female. HPI  C/o left shoulder pain, not better since last visit, she did do her home exercises and took the prednisone, pain is 5/10 with activity, better with rest  ROS    Physical Exam   Musculoskeletal:        Left shoulder: She exhibits tenderness (anterolateral side, empty can sign positive, abduction above 90 degrees cause pain). She exhibits normal range of motion. Vitals reviewed. ASSESSMENT and PLAN  Diagnoses and all orders for this visit:    1. Impingement syndrome of left shoulder  -     METHYLPREDNISOLONE ACETATE INJECTION 80 MG  -     DRAIN/INJECT LARGE JOINT/BURSA    After obtaining written consent, using sterile fashion, the left subacromial space was injected with 80 mg depomedrol  mixed with 3 cc lidocaine . Pt tolerated the procedure well. After care instructions given.     [facility]  OFFICE PROCEDURE PROGRESS NOTE        Chart reviewed for the following:   Ganesh Grullon MD, have reviewed the History, Physical and updated the Allergic reactions for [address] [place] [address] performed immediately prior to start of procedure:   Ganesh Grullon MD, have performed the following reviews on Onur Field prior to the start of the procedure:            * Patient was identified by name and date of birth   * Agreement on procedure being performed was verified  * Risks and Benefits explained to the patient  * Procedure site verified and marked as necessary  * Patient was positioned for comfort  * Consent was signed and verified     Date of procedure:8/7/2019   Time of procedure: 3:03 pm  Procedure performed by:  Charmaine Denver, MD    Provider assisted by: ANNA    Patient assisted by: self    Pt tolerated the procedure well with no complications    Comments: her left shoulder pain was down from 5/10  to 1-2/10 after the injection with abduction against resistance    Pt was advised to contact our office immediately if redness/pain or fever develops, Pt verbalized understanding

## 2019-08-07 NOTE — PATIENT INSTRUCTIONS
Joint Injections: Care Instructions Your Care Instructions Joint injections are shots into a joint, such as the knee. They may be used to put in medicines, such as pain relievers. A corticosteroid, or steroid, shot is used to reduce inflammation in tendons or joints. It is often used to treat problems such as arthritis, tendinitis, and bursitis. Steroids can be injected directly into a painful, inflamed joint. They can also help reduce inflammation of a bursa. A bursa is a sac of fluid. It cushions and lubricates areas where tendons, ligaments, skin, muscles, or bones rub against each other. A steroid shot can sometimes help with short-term pain relief when other treatments haven't worked. If steroid shots help, pain may improve for weeks or months. Follow-up care is a key part of your treatment and safety. Be sure to make and go to all appointments, and call your doctor if you are having problems. It's also a good idea to know your test results and keep a list of the medicines you take. How can you care for yourself at home? · Put ice or a cold pack on the area for 10 to 20 minutes at a time. Put a thin cloth between the ice and your skin. · Ask your doctor if you can take an over-the-counter pain medicine, such as acetaminophen (Tylenol), ibuprofen (Advil, Motrin), or naproxen (Aleve). Be safe with medicines. Read and follow all instructions on the label. · Avoid strenuous activities for several days. In particular, avoid ones that put stress on the area where you got the shot. · If you have dressings over the area, keep them clean and dry. You may remove them when your doctor tells you to. When should you call for help? Call your doctor now or seek immediate medical care if: 
  · You have signs of infection, such as: 
? Increased pain, swelling, warmth, or redness. ? Red streaks leading from the site. ? Pus draining from the site. ? A fever.  Watch closely for changes in your health, and be sure to contact your doctor if you have any problems. Where can you learn more? Go to http://ishmael-megan.info/. Enter N616 in the search box to learn more about \"Joint Injections: Care Instructions. \" Current as of: September 20, 2018 Content Version: 12.1 © 1612-9630 [employer]. Care instructions adapted under license by [employer] (which disclaims liability or warranty for this information). If you have questions about a medical condition or this instruction, always ask your healthcare professional. [address] any warranty or liability for your use of this information.

## 2019-09-10 DIAGNOSIS — M75.42 IMPINGEMENT SYNDROME OF LEFT SHOULDER: Primary | ICD-10-CM

## 2019-10-23 ENCOUNTER — HOSPITAL ENCOUNTER (OUTPATIENT)
Dept: LAB | Age: 49
Discharge: HOME | End: 2019-10-23
Payer: OTHER GOVERNMENT

## 2019-10-23 ENCOUNTER — OFFICE VISIT (OUTPATIENT)
Dept: FAMILY MEDICINE CLINIC | Age: 49
End: 2019-10-23

## 2019-10-23 VITALS
HEIGHT: 66 IN | HEART RATE: 74 BPM | OXYGEN SATURATION: 96 % | WEIGHT: 165 LBS | RESPIRATION RATE: 18 BRPM | SYSTOLIC BLOOD PRESSURE: 105 MMHG | DIASTOLIC BLOOD PRESSURE: 52 MMHG | TEMPERATURE: 97.8 F | BODY MASS INDEX: 26.52 KG/M2

## 2019-10-23 DIAGNOSIS — E78.2 MIXED HYPERLIPIDEMIA: ICD-10-CM

## 2019-10-23 DIAGNOSIS — Z98.84 S/P GASTRIC BYPASS: ICD-10-CM

## 2019-10-23 DIAGNOSIS — R35.0 FREQUENCY OF URINATION: ICD-10-CM

## 2019-10-23 DIAGNOSIS — R73.03 PRE-DIABETES: ICD-10-CM

## 2019-10-23 DIAGNOSIS — R35.0 FREQUENCY OF URINATION: Primary | ICD-10-CM

## 2019-10-23 DIAGNOSIS — E55.9 VITAMIN D DEFICIENCY: ICD-10-CM

## 2019-10-23 LAB
BILIRUB UR QL STRIP: NEGATIVE
GLUCOSE UR-MCNC: NEGATIVE MG/DL
KETONES P FAST UR STRIP-MCNC: NEGATIVE MG/DL
PH UR STRIP: 7 [PH] (ref 4.6–8)
PROT UR QL STRIP: NEGATIVE
SP GR UR STRIP: 1.02 (ref 1–1.03)
UA UROBILINOGEN AMB POC: NORMAL (ref 0.2–1)
URINALYSIS CLARITY POC: NORMAL
URINALYSIS COLOR POC: YELLOW
URINE BLOOD POC: NEGATIVE
URINE LEUKOCYTES POC: NORMAL
URINE NITRITES POC: NEGATIVE

## 2019-10-23 PROCEDURE — 87186 SC STD MICRODIL/AGAR DIL: CPT

## 2019-10-23 PROCEDURE — 87086 URINE CULTURE/COLONY COUNT: CPT

## 2019-10-23 PROCEDURE — 87077 CULTURE AEROBIC IDENTIFY: CPT

## 2019-10-23 RX ORDER — NITROFURANTOIN 25; 75 MG/1; MG/1
100 CAPSULE ORAL 2 TIMES DAILY
Qty: 14 CAP | Refills: 0 | Status: SHIPPED | OUTPATIENT
Start: 2019-10-23 | End: 2020-03-26 | Stop reason: SDUPTHER

## 2019-10-23 NOTE — PROGRESS NOTES
HISTORY OF PRESENT ILLNESS  Jeff Saleh is a 46 y.o. female. HPI  C/o dysuria, urinary frequency for few days, no fever or chills, no hematuria  HLD, stable, diet controlled  Vit D def, stable on vit d daily, last level was 41  S/p gastric bypass surgery, her BMI is 26, she gained 5 lbs since last visit, advised to diet and lose weight, avoid gaining any more weight  Pre diabetes, stable, last a1c was  Review of Systems   Constitutional: Negative for chills and fever. Respiratory: Negative for shortness of breath. Cardiovascular: Negative for chest pain, palpitations and leg swelling. Gastrointestinal: Negative for abdominal pain and nausea. Genitourinary: Negative for flank pain and frequency. Neurological: Negative for headaches. Physical Exam   Constitutional: She is oriented to person, place, and time. She appears well-developed. Neck: No thyromegaly present. Cardiovascular: Normal rate, regular rhythm and normal heart sounds. Pulmonary/Chest: Effort normal and breath sounds normal. She has no rales. Abdominal: Soft. Bowel sounds are normal. There is tenderness in the suprapubic area. Musculoskeletal: She exhibits no edema. Neurological: She is alert and oriented to person, place, and time. Vitals reviewed. ASSESSMENT and PLAN  Diagnoses and all orders for this visit:    1. Frequency of urination, UTI  -     AMB POC URINALYSIS DIP STICK AUTO W/O MICRO  -     nitrofurantoin, macrocrystal-monohydrate, (MACROBID) 100 mg capsule; Take 1 Cap by mouth two (2) times a day. -     CULTURE, URINE; Future    2. Vitamin D deficiency, stable  -     VITAMIN D, 25 HYDROXY; Future    3. S/P gastric bypass  -     LIPID PANEL; Future  -     METABOLIC PANEL, COMPREHENSIVE; Future  -     TSH 3RD GENERATION; Future  -     VITAMIN B12 & FOLATE; Future  -     IRON; Future  -     VITAMIN B1, WHOLE BLOOD; Future  -     CBC WITH AUTOMATED DIFF; Future    4.  Mixed hyperlipidemia, stable  - LIPID PANEL; Future  -     METABOLIC PANEL, COMPREHENSIVE; Future  -     TSH 3RD GENERATION; Future  -     CBC WITH AUTOMATED DIFF; Future    5. Pre-diabetes, stable  -     HEMOGLOBIN A1C WITH EAG;  Future    rtc 6 mos    Results for orders placed or performed in visit on 10/23/19   AMB POC URINALYSIS DIP STICK AUTO W/O MICRO   Result Value Ref Range    Color (UA POC) Yellow     Clarity (UA POC) Cloudy     Glucose (UA POC) Negative Negative    Bilirubin (UA POC) Negative Negative    Ketones (UA POC) Negative Negative    Specific gravity (UA POC) 1.020 1.001 - 1.035    Blood (UA POC) Negative Negative    pH (UA POC) 7.0 4.6 - 8.0    Protein (UA POC) Negative Negative    Urobilinogen (UA POC) 0.2 mg/dL 0.2 - 1    Nitrites (UA POC) Negative Negative    Leukocyte esterase (UA POC) Trace Negative       Lab Results   Component Value Date/Time    Hemoglobin A1c 5.1 06/19/2018 10:57 AM     Lab Results   Component Value Date/Time    Cholesterol, total 167 06/19/2018 10:57 AM    HDL Cholesterol 59 06/19/2018 10:57 AM    LDL, calculated 94.4 06/19/2018 10:57 AM    VLDL, calculated 13.6 06/19/2018 10:57 AM    Triglyceride 68 06/19/2018 10:57 AM    CHOL/HDL Ratio 2.8 06/19/2018 10:57 AM     Lab Results   Component Value Date/Time    Vitamin D 25-Hydroxy 41.3 06/19/2018 10:57 AM

## 2019-10-23 NOTE — PROGRESS NOTES
Christina Zimmerman is a 46 y.o. female (: 1970) presenting to address:    Chief Complaint   Patient presents with    Urinary Frequency       Vitals:    10/23/19 1420   BP: 105/52   Pulse: 74   Resp: 18   Temp: 97.8 °F (36.6 °C)   TempSrc: Oral   SpO2: 96%   Weight: 165 lb (74.8 kg)   Height: 5' 6\" (1.676 m)   PainSc:   4   PainLoc: Pelvic       Learning Assessment:     Learning Assessment 2015   PRIMARY LEARNER Patient   HIGHEST LEVEL OF EDUCATION - PRIMARY LEARNER  2 YEARS OF COLLEGE   BARRIERS PRIMARY LEARNER NONE   CO-LEARNER CAREGIVER No   PRIMARY LANGUAGE ENGLISH    NEED No   LEARNER PREFERENCE PRIMARY READING   LEARNING SPECIAL TOPICS none   ANSWERED BY patient   RELATIONSHIP SELF   ASSESSMENT COMMENT n/a     Depression Screening:     3 most recent PHQ Screens 10/23/2019   Little interest or pleasure in doing things Not at all   Feeling down, depressed, irritable, or hopeless Not at all   Total Score PHQ 2 0     Fall Risk Assessment:     Fall Risk Assessment, last 12 mths 2019   Able to walk? Yes   Fall in past 12 months? No     Abuse Screening:     Abuse Screening Questionnaire 2019   Do you ever feel afraid of your partner? N   Are you in a relationship with someone who physically or mentally threatens you? N   Is it safe for you to go home? Y     Coordination of Care Questionaire:   1. Have you been to the ER, urgent care clinic since your last visit? Hospitalized since your last visit? YES- [facility]- ED    2. Have you seen or consulted any other health care providers outside of the [address] [place] [address] since your last visit? Include any pap smears or colon screening. YES- orthopedic    Advanced Directive:   1. Do you have an Advanced Directive? YES    2. Would you like information on Advanced Directives?  NO

## 2019-10-27 LAB
BACTERIA SPEC CULT: ABNORMAL
BACTERIA SPEC CULT: ABNORMAL
SERVICE CMNT-IMP: ABNORMAL

## 2019-10-31 ENCOUNTER — HOSPITAL ENCOUNTER (OUTPATIENT)
Dept: LAB | Age: 49
Discharge: HOME | End: 2019-10-31
Payer: OTHER GOVERNMENT

## 2019-10-31 DIAGNOSIS — Z98.84 S/P GASTRIC BYPASS: ICD-10-CM

## 2019-10-31 DIAGNOSIS — R73.03 PRE-DIABETES: ICD-10-CM

## 2019-10-31 DIAGNOSIS — E78.2 MIXED HYPERLIPIDEMIA: ICD-10-CM

## 2019-10-31 DIAGNOSIS — E55.9 VITAMIN D DEFICIENCY: ICD-10-CM

## 2019-10-31 LAB
25(OH)D3 SERPL-MCNC: 38.7 NG/ML (ref 30–100)
ALBUMIN SERPL-MCNC: 4.2 G/DL (ref 3.4–5)
ALBUMIN/GLOB SERPL: 1.4 {RATIO} (ref 0.8–1.7)
ALP SERPL-CCNC: 87 U/L (ref 45–117)
ALT SERPL-CCNC: 26 U/L (ref 13–56)
ANION GAP SERPL CALC-SCNC: 4 MMOL/L (ref 3–18)
AST SERPL-CCNC: 14 U/L (ref 10–38)
BASOPHILS # BLD: 0 K/UL (ref 0–0.1)
BASOPHILS NFR BLD: 1 % (ref 0–2)
BILIRUB SERPL-MCNC: 0.5 MG/DL (ref 0.2–1)
BUN SERPL-MCNC: 17 MG/DL (ref 7–18)
BUN/CREAT SERPL: 24 (ref 12–20)
CALCIUM SERPL-MCNC: 9.7 MG/DL (ref 8.5–10.1)
CHLORIDE SERPL-SCNC: 106 MMOL/L (ref 100–111)
CHOLEST SERPL-MCNC: 206 MG/DL
CO2 SERPL-SCNC: 31 MMOL/L (ref 21–32)
CREAT SERPL-MCNC: 0.71 MG/DL (ref 0.6–1.3)
DIFFERENTIAL METHOD BLD: NORMAL
EOSINOPHIL # BLD: 0.1 K/UL (ref 0–0.4)
EOSINOPHIL NFR BLD: 2 % (ref 0–5)
ERYTHROCYTE [DISTWIDTH] IN BLOOD BY AUTOMATED COUNT: 12.6 % (ref 11.6–14.5)
EST. AVERAGE GLUCOSE BLD GHB EST-MCNC: 100 MG/DL
FOLATE SERPL-MCNC: >20 NG/ML (ref 3.1–17.5)
GLOBULIN SER CALC-MCNC: 3.1 G/DL (ref 2–4)
GLUCOSE SERPL-MCNC: 87 MG/DL (ref 74–99)
HBA1C MFR BLD: 5.1 % (ref 4.2–5.6)
HCT VFR BLD AUTO: 41.3 % (ref 35–45)
HDLC SERPL-MCNC: 69 MG/DL (ref 40–60)
HDLC SERPL: 3 {RATIO} (ref 0–5)
HGB BLD-MCNC: 13.4 G/DL (ref 12–16)
IRON SERPL-MCNC: 101 UG/DL (ref 50–175)
LDLC SERPL CALC-MCNC: 119 MG/DL (ref 0–100)
LIPID PROFILE,FLP: ABNORMAL
LYMPHOCYTES # BLD: 2.6 K/UL (ref 0.9–3.6)
LYMPHOCYTES NFR BLD: 39 % (ref 21–52)
MCH RBC QN AUTO: 30.3 PG (ref 24–34)
MCHC RBC AUTO-ENTMCNC: 32.4 G/DL (ref 31–37)
MCV RBC AUTO: 93.4 FL (ref 74–97)
MONOCYTES # BLD: 0.5 K/UL (ref 0.05–1.2)
MONOCYTES NFR BLD: 7 % (ref 3–10)
NEUTS SEG # BLD: 3.4 K/UL (ref 1.8–8)
NEUTS SEG NFR BLD: 51 % (ref 40–73)
PLATELET # BLD AUTO: 316 K/UL (ref 135–420)
PMV BLD AUTO: 10.5 FL (ref 9.2–11.8)
POTASSIUM SERPL-SCNC: 4.6 MMOL/L (ref 3.5–5.5)
PROT SERPL-MCNC: 7.3 G/DL (ref 6.4–8.2)
RBC # BLD AUTO: 4.42 M/UL (ref 4.2–5.3)
SODIUM SERPL-SCNC: 141 MMOL/L (ref 136–145)
TRIGL SERPL-MCNC: 90 MG/DL (ref ?–150)
TSH SERPL DL<=0.05 MIU/L-ACNC: 2.46 UIU/ML (ref 0.36–3.74)
VIT B12 SERPL-MCNC: 693 PG/ML (ref 211–911)
VLDLC SERPL CALC-MCNC: 18 MG/DL
WBC # BLD AUTO: 6.6 K/UL (ref 4.6–13.2)

## 2019-10-31 PROCEDURE — 80053 COMPREHEN METABOLIC PANEL: CPT

## 2019-10-31 PROCEDURE — 84425 ASSAY OF VITAMIN B-1: CPT

## 2019-10-31 PROCEDURE — 84443 ASSAY THYROID STIM HORMONE: CPT

## 2019-10-31 PROCEDURE — 36415 COLL VENOUS BLD VENIPUNCTURE: CPT

## 2019-10-31 PROCEDURE — 87086 URINE CULTURE/COLONY COUNT: CPT

## 2019-10-31 PROCEDURE — 85025 COMPLETE CBC W/AUTO DIFF WBC: CPT

## 2019-10-31 PROCEDURE — 83540 ASSAY OF IRON: CPT

## 2019-10-31 PROCEDURE — 82306 VITAMIN D 25 HYDROXY: CPT

## 2019-10-31 PROCEDURE — 83036 HEMOGLOBIN GLYCOSYLATED A1C: CPT

## 2019-10-31 PROCEDURE — 80061 LIPID PANEL: CPT

## 2019-10-31 PROCEDURE — 82607 VITAMIN B-12: CPT

## 2019-11-02 LAB
BACTERIA SPEC CULT: NORMAL
SERVICE CMNT-IMP: NORMAL

## 2019-11-03 LAB — VIT B1 BLD-SCNC: 141.3 NMOL/L (ref 66.5–200)

## 2019-11-03 NOTE — PROGRESS NOTES
Urine culture is negative  Vit B 12 is normal  Vit D is normal  Iron normal  Hgb normal  Glucose and A1c are normal  Cholesterol is slightly elevated, but her HDL is also elevated which is good, just need low cholesterol diet

## 2020-02-04 ENCOUNTER — TELEPHONE (OUTPATIENT)
Dept: FAMILY MEDICINE CLINIC | Age: 50
End: 2020-02-04

## 2020-02-04 NOTE — TELEPHONE ENCOUNTER
Fax from [employer] received  Facility: [facility]   For Fixation of Shoulder effective 1/30/2020-7/27/2020 #1  Auth# [id number]    For Mackenzie Siddiqi  [phone]    Submitted for scanning

## 2020-03-25 ENCOUNTER — TELEPHONE (OUTPATIENT)
Dept: FAMILY MEDICINE CLINIC | Age: 50
End: 2020-03-25

## 2020-03-25 NOTE — TELEPHONE ENCOUNTER
Pt think she may have a bladder infection and would like to know if an order can be put in for a urine sample.

## 2020-03-26 ENCOUNTER — OFFICE VISIT (OUTPATIENT)
Dept: FAMILY MEDICINE CLINIC | Age: 50
End: 2020-03-26

## 2020-03-26 VITALS
HEIGHT: 66 IN | SYSTOLIC BLOOD PRESSURE: 107 MMHG | HEART RATE: 77 BPM | RESPIRATION RATE: 20 BRPM | WEIGHT: 173.6 LBS | BODY MASS INDEX: 27.9 KG/M2 | OXYGEN SATURATION: 99 % | DIASTOLIC BLOOD PRESSURE: 73 MMHG | TEMPERATURE: 97.6 F

## 2020-03-26 DIAGNOSIS — R30.0 DYSURIA: Primary | ICD-10-CM

## 2020-03-26 LAB
BILIRUB UR QL STRIP: NEGATIVE
GLUCOSE UR-MCNC: NEGATIVE MG/DL
KETONES P FAST UR STRIP-MCNC: NEGATIVE MG/DL
PH UR STRIP: 7 [PH] (ref 4.6–8)
PROT UR QL STRIP: NEGATIVE
SP GR UR STRIP: 1.01 (ref 1–1.03)
UA UROBILINOGEN AMB POC: NORMAL (ref 0.2–1)
URINALYSIS CLARITY POC: CLEAR
URINALYSIS COLOR POC: YELLOW
URINE BLOOD POC: NEGATIVE
URINE LEUKOCYTES POC: NORMAL
URINE NITRITES POC: NEGATIVE

## 2020-03-26 RX ORDER — NITROFURANTOIN 25; 75 MG/1; MG/1
100 CAPSULE ORAL 2 TIMES DAILY
Qty: 14 CAP | Refills: 0 | Status: SHIPPED | OUTPATIENT
Start: 2020-03-26 | End: 2020-03-31 | Stop reason: ALTCHOICE

## 2020-03-26 NOTE — PROGRESS NOTES
Hamida Montelongo is a 44 y.o. female (: 1970) presenting to address:    Chief Complaint   Patient presents with    Urgency       Vitals:    20 0949   BP: 107/73   Pulse: 77   Resp: 20   Temp: 97.6 °F (36.4 °C)   TempSrc: Oral   SpO2: 99%   Weight: 173 lb 9.6 oz (78.7 kg)   Height: 5' 6\" (1.676 m)   PainSc:   5   PainLoc: Back       Hearing/Vision:       Learning Assessment 2015   PRIMARY LEARNER Patient   HIGHEST LEVEL OF EDUCATION - PRIMARY LEARNER  2 YEARS OF COLLEGE   BARRIERS PRIMARY LEARNER NONE   CO-LEARNER CAREGIVER No   PRIMARY LANGUAGE ENGLISH    NEED No   LEARNER PREFERENCE PRIMARY READING   LEARNING SPECIAL TOPICS none   ANSWERED BY patient   RELATIONSHIP SELF   ASSESSMENT COMMENT n/a     Depression Screening:     3 most recent PHQ Screens 3/26/2020   Little interest or pleasure in doing things Not at all   Feeling down, depressed, irritable, or hopeless Not at all   Total Score PHQ 2 0     Fall Risk Assessment:     Fall Risk Assessment, last 12 mths 2019   Able to walk? Yes   Fall in past 12 months? No     Abuse Screening:     Abuse Screening Questionnaire 2019   Do you ever feel afraid of your partner? N   Are you in a relationship with someone who physically or mentally threatens you? N   Is it safe for you to go home? Y     Coordination of Care Questionaire:   1. Have you been to the ER, urgent care clinic since your last visit? Hospitalized since your last visit? YES- [facility] [place], [facility]- outpatient surgery    2. Have you seen or consulted any other health care providers outside of the [address] [place] [address] since your last visit? Include any pap smears or colon screening. YES- Surgeon    Advanced Directive:   1. Do you have an Advanced Directive? YES    2. Would you like information on Advanced Directives?  NO

## 2020-03-26 NOTE — PROGRESS NOTES
HISTORY OF PRESENT ILLNESS  Micah Hernandez is a 44 y.o. female. HPI  C/o urinary urgency/frequency and dysuria, started few days ago, no hematuria, no fever or chills  Has right flank pain on/off  Review of Systems   Constitutional: Negative for chills and fever. Gastrointestinal: Negative for nausea and vomiting. Genitourinary: Negative for hematuria. Physical Exam  Vitals signs reviewed. Abdominal:      Tenderness: There is abdominal tenderness in the suprapubic area. There is no right CVA tenderness or left CVA tenderness. ASSESSMENT and PLAN  Diagnoses and all orders for this visit:    1. Dysuria, UTI  -     AMB POC URINALYSIS DIP STICK AUTO W/O MICRO  -     CULTURE, URINE; Future  -     nitrofurantoin, macrocrystal-monohydrate, (Macrobid) 100 mg capsule; Take 1 Cap by mouth two (2) times a day.     Results for orders placed or performed in visit on 03/26/20   AMB POC URINALYSIS DIP STICK AUTO W/O MICRO   Result Value Ref Range    Color (UA POC) Yellow     Clarity (UA POC) Clear     Glucose (UA POC) Negative Negative    Bilirubin (UA POC) Negative Negative    Ketones (UA POC) Negative Negative    Specific gravity (UA POC) 1.015 1.001 - 1.035    Blood (UA POC) Negative Negative    pH (UA POC) 7.0 4.6 - 8.0    Protein (UA POC) Negative Negative    Urobilinogen (UA POC) 0.2 mg/dL 0.2 - 1    Nitrites (UA POC) Negative Negative    Leukocyte esterase (UA POC) 1+ Negative

## 2020-03-26 NOTE — TELEPHONE ENCOUNTER
Called and LVM to pt that appt is needed and we will work her in today, approved by Dr. Rae Castaneda.

## 2020-03-30 LAB — BACTERIA UR CULT: ABNORMAL

## 2020-03-31 RX ORDER — SULFAMETHOXAZOLE AND TRIMETHOPRIM 800; 160 MG/1; MG/1
1 TABLET ORAL 2 TIMES DAILY
Qty: 14 TAB | Refills: 0 | Status: SHIPPED | OUTPATIENT
Start: 2020-03-31 | End: 2020-04-07

## 2020-03-31 RX ORDER — FLUCONAZOLE 150 MG/1
150 TABLET ORAL DAILY
Qty: 1 TAB | Refills: 0 | Status: SHIPPED | OUTPATIENT
Start: 2020-03-31 | End: 2020-04-01

## 2020-03-31 NOTE — PROGRESS NOTES
I discussed with pt, still having frequency/urgency, will stop macrobid and start Bactrim DS, 1 po BID  Also Rx for Diflucan given in case she develops yeast vaginitis

## 2020-07-21 DIAGNOSIS — Z12.39 BREAST CANCER SCREENING: Primary | ICD-10-CM

## 2020-10-12 DIAGNOSIS — Z12.39 BREAST CANCER SCREENING: ICD-10-CM

## 2021-07-19 DIAGNOSIS — Z98.84 S/P GASTRIC BYPASS: ICD-10-CM

## 2021-07-19 DIAGNOSIS — E78.2 MIXED HYPERLIPIDEMIA: Primary | ICD-10-CM

## 2021-07-19 DIAGNOSIS — R73.03 PRE-DIABETES: ICD-10-CM

## 2021-07-19 DIAGNOSIS — Z11.59 NEED FOR HEPATITIS C SCREENING TEST: ICD-10-CM

## 2021-07-19 DIAGNOSIS — E55.9 VITAMIN D DEFICIENCY: ICD-10-CM

## 2021-08-12 ENCOUNTER — APPOINTMENT (OUTPATIENT)
Dept: FAMILY MEDICINE CLINIC | Age: 51
End: 2021-08-12

## 2021-08-12 ENCOUNTER — HOSPITAL ENCOUNTER (OUTPATIENT)
Dept: LAB | Age: 51
Discharge: HOME | End: 2021-08-12
Payer: OTHER GOVERNMENT

## 2021-08-12 DIAGNOSIS — Z11.59 NEED FOR HEPATITIS C SCREENING TEST: ICD-10-CM

## 2021-08-12 DIAGNOSIS — R73.03 PRE-DIABETES: ICD-10-CM

## 2021-08-12 DIAGNOSIS — E78.2 MIXED HYPERLIPIDEMIA: ICD-10-CM

## 2021-08-12 DIAGNOSIS — E55.9 VITAMIN D DEFICIENCY: ICD-10-CM

## 2021-08-12 DIAGNOSIS — Z98.84 S/P GASTRIC BYPASS: ICD-10-CM

## 2021-08-12 LAB
25(OH)D3 SERPL-MCNC: 52.3 NG/ML (ref 30–100)
ALBUMIN SERPL-MCNC: 4 G/DL (ref 3.4–5)
ALBUMIN/GLOB SERPL: 1.3 {RATIO} (ref 0.8–1.7)
ALP SERPL-CCNC: 122 U/L (ref 45–117)
ALT SERPL-CCNC: 26 U/L (ref 13–56)
ANION GAP SERPL CALC-SCNC: 2 MMOL/L (ref 3–18)
AST SERPL-CCNC: 19 U/L (ref 10–38)
BASOPHILS # BLD: 0 K/UL (ref 0–0.1)
BASOPHILS NFR BLD: 0 % (ref 0–2)
BILIRUB SERPL-MCNC: 0.4 MG/DL (ref 0.2–1)
BUN SERPL-MCNC: 15 MG/DL (ref 7–18)
BUN/CREAT SERPL: 22 (ref 12–20)
CALCIUM SERPL-MCNC: 9.8 MG/DL (ref 8.5–10.1)
CHLORIDE SERPL-SCNC: 107 MMOL/L (ref 100–111)
CHOLEST SERPL-MCNC: 214 MG/DL
CO2 SERPL-SCNC: 32 MMOL/L (ref 21–32)
CREAT SERPL-MCNC: 0.69 MG/DL (ref 0.6–1.3)
DIFFERENTIAL METHOD BLD: NORMAL
EOSINOPHIL # BLD: 0.1 K/UL (ref 0–0.4)
EOSINOPHIL NFR BLD: 2 % (ref 0–5)
ERYTHROCYTE [DISTWIDTH] IN BLOOD BY AUTOMATED COUNT: 12.7 % (ref 11.6–14.5)
EST. AVERAGE GLUCOSE BLD GHB EST-MCNC: 105 MG/DL
GLOBULIN SER CALC-MCNC: 3.2 G/DL (ref 2–4)
GLUCOSE SERPL-MCNC: 85 MG/DL (ref 74–99)
HBA1C MFR BLD: 5.3 % (ref 4.2–5.6)
HCT VFR BLD AUTO: 42.2 % (ref 35–45)
HDLC SERPL-MCNC: 62 MG/DL (ref 40–60)
HDLC SERPL: 3.5 {RATIO} (ref 0–5)
HGB BLD-MCNC: 13.7 G/DL (ref 12–16)
IRON SERPL-MCNC: 83 UG/DL (ref 50–175)
LDLC SERPL CALC-MCNC: 125.8 MG/DL (ref 0–100)
LIPID PROFILE,FLP: ABNORMAL
LYMPHOCYTES # BLD: 3 K/UL (ref 0.9–3.6)
LYMPHOCYTES NFR BLD: 37 % (ref 21–52)
MCH RBC QN AUTO: 30 PG (ref 24–34)
MCHC RBC AUTO-ENTMCNC: 32.5 G/DL (ref 31–37)
MCV RBC AUTO: 92.3 FL (ref 74–97)
MONOCYTES # BLD: 0.6 K/UL (ref 0.05–1.2)
MONOCYTES NFR BLD: 8 % (ref 3–10)
NEUTS SEG # BLD: 4.3 K/UL (ref 1.8–8)
NEUTS SEG NFR BLD: 53 % (ref 40–73)
PLATELET # BLD AUTO: 343 K/UL (ref 135–420)
PMV BLD AUTO: 10.8 FL (ref 9.2–11.8)
POTASSIUM SERPL-SCNC: 4.6 MMOL/L (ref 3.5–5.5)
PROT SERPL-MCNC: 7.2 G/DL (ref 6.4–8.2)
RBC # BLD AUTO: 4.57 M/UL (ref 4.2–5.3)
SODIUM SERPL-SCNC: 141 MMOL/L (ref 136–145)
TRIGL SERPL-MCNC: 131 MG/DL (ref ?–150)
VIT B12 SERPL-MCNC: 827 PG/ML (ref 211–911)
VLDLC SERPL CALC-MCNC: 26.2 MG/DL
WBC # BLD AUTO: 8.1 K/UL (ref 4.6–13.2)

## 2021-08-12 PROCEDURE — 86803 HEPATITIS C AB TEST: CPT

## 2021-08-12 PROCEDURE — 82306 VITAMIN D 25 HYDROXY: CPT

## 2021-08-12 PROCEDURE — 85025 COMPLETE CBC W/AUTO DIFF WBC: CPT

## 2021-08-12 PROCEDURE — 83540 ASSAY OF IRON: CPT

## 2021-08-12 PROCEDURE — 83036 HEMOGLOBIN GLYCOSYLATED A1C: CPT

## 2021-08-12 PROCEDURE — 36415 COLL VENOUS BLD VENIPUNCTURE: CPT

## 2021-08-12 PROCEDURE — 80061 LIPID PANEL: CPT

## 2021-08-12 PROCEDURE — 82607 VITAMIN B-12: CPT

## 2021-08-12 PROCEDURE — 80053 COMPREHEN METABOLIC PANEL: CPT

## 2021-08-13 LAB
HCV AB S/CO SERPL IA: <0.1 S/CO RATIO (ref 0–0.9)
HCV AB SERPL QL IA: NORMAL

## 2021-08-19 ENCOUNTER — OFFICE VISIT (OUTPATIENT)
Dept: FAMILY MEDICINE CLINIC | Age: 51
End: 2021-08-19
Payer: OTHER GOVERNMENT

## 2021-08-19 ENCOUNTER — HOSPITAL ENCOUNTER (OUTPATIENT)
Dept: LAB | Age: 51
Discharge: HOME | End: 2021-08-19
Payer: OTHER GOVERNMENT

## 2021-08-19 VITALS
BODY MASS INDEX: 30.59 KG/M2 | DIASTOLIC BLOOD PRESSURE: 71 MMHG | HEIGHT: 65 IN | OXYGEN SATURATION: 97 % | WEIGHT: 183.6 LBS | RESPIRATION RATE: 20 BRPM | SYSTOLIC BLOOD PRESSURE: 109 MMHG | HEART RATE: 91 BPM | TEMPERATURE: 98.2 F

## 2021-08-19 DIAGNOSIS — Z01.419 ROUTINE GYNECOLOGICAL EXAMINATION: ICD-10-CM

## 2021-08-19 DIAGNOSIS — R73.03 PRE-DIABETES: ICD-10-CM

## 2021-08-19 DIAGNOSIS — R35.0 FREQUENCY OF URINATION: ICD-10-CM

## 2021-08-19 DIAGNOSIS — E55.9 VITAMIN D DEFICIENCY: ICD-10-CM

## 2021-08-19 DIAGNOSIS — Z00.00 ROUTINE GENERAL MEDICAL EXAMINATION AT A HEALTH CARE FACILITY: Primary | ICD-10-CM

## 2021-08-19 DIAGNOSIS — G47.33 OSA (OBSTRUCTIVE SLEEP APNEA): ICD-10-CM

## 2021-08-19 LAB
BILIRUB UR QL STRIP: NEGATIVE
GLUCOSE UR-MCNC: NEGATIVE MG/DL
KETONES P FAST UR STRIP-MCNC: NEGATIVE MG/DL
PH UR STRIP: 7 [PH] (ref 4.6–8)
PROT UR QL STRIP: NEGATIVE
SP GR UR STRIP: 1.02 (ref 1–1.03)
UA UROBILINOGEN AMB POC: NORMAL (ref 0.2–1)
URINALYSIS CLARITY POC: NORMAL
URINALYSIS COLOR POC: YELLOW
URINE BLOOD POC: NEGATIVE
URINE LEUKOCYTES POC: NORMAL
URINE NITRITES POC: POSITIVE

## 2021-08-19 PROCEDURE — 99396 PREV VISIT EST AGE 40-64: CPT | Performed by: INTERNAL MEDICINE

## 2021-08-19 PROCEDURE — 87077 CULTURE AEROBIC IDENTIFY: CPT

## 2021-08-19 PROCEDURE — 87186 SC STD MICRODIL/AGAR DIL: CPT

## 2021-08-19 PROCEDURE — 87086 URINE CULTURE/COLONY COUNT: CPT

## 2021-08-19 PROCEDURE — 81003 URINALYSIS AUTO W/O SCOPE: CPT | Performed by: INTERNAL MEDICINE

## 2021-08-19 RX ORDER — NITROFURANTOIN 25; 75 MG/1; MG/1
100 CAPSULE ORAL 2 TIMES DAILY
Qty: 14 CAPSULE | Refills: 0 | Status: SHIPPED | OUTPATIENT
Start: 2021-08-19 | End: 2021-08-26

## 2021-08-19 NOTE — PROGRESS NOTES
Zahira Cherry is a 44 y.o. female (: 1970) presenting to address:    Chief Complaint   Patient presents with    Physical       Vitals:    21 0957   BP: 109/71   Pulse: 91   Resp: 20   Temp: 98.2 °F (36.8 °C)   TempSrc: Temporal   SpO2: 97%   Weight: 183 lb 9.6 oz (83.3 kg)   Height: 5' 4.5\" (1.638 m)   PainSc:   0 - No pain       Hearing/Vision:      Hearing Screening    125Hz 250Hz 500Hz 1000Hz 2000Hz 3000Hz 4000Hz 6000Hz 8000Hz   Right ear:            Left ear:               Visual Acuity Screening    Right eye Left eye Both eyes   Without correction:      With correction: 20/25 20/20 20/20       Learning Assessment:     Learning Assessment 2015   PRIMARY LEARNER Patient   HIGHEST LEVEL OF EDUCATION - PRIMARY LEARNER  2 YEARS OF COLLEGE   BARRIERS PRIMARY LEARNER NONE   CO-LEARNER CAREGIVER No   PRIMARY LANGUAGE ENGLISH    NEED No   LEARNER PREFERENCE PRIMARY READING   LEARNING SPECIAL TOPICS none   ANSWERED BY patient   RELATIONSHIP SELF   ASSESSMENT COMMENT n/a     Depression Screening:     3 most recent PHQ Screens 2021   Little interest or pleasure in doing things Not at all   Feeling down, depressed, irritable, or hopeless Not at all   Total Score PHQ 2 0     Fall Risk Assessment:     Fall Risk Assessment, last 12 mths 2021   Able to walk? Yes   Fall in past 12 months? 0   Do you feel unsteady? 0   Are you worried about falling 0     Abuse Screening:     Abuse Screening Questionnaire 2021   Do you ever feel afraid of your partner? N   Are you in a relationship with someone who physically or mentally threatens you? N   Is it safe for you to go home? Y     Coordination of Care Questionaire:   1. Have you been to the ER, urgent care clinic since your last visit? Hospitalized since your last visit? YES- [facility]    2. Have you seen or consulted any other health care providers outside of the [address] [place] [address] since your last visit?   Include any pap smears or colon screening. YES- dentist    Advanced Directive:   1. Do you have an Advanced Directive? YES    2. Would you like information on Advanced Directives?  NO

## 2021-08-19 NOTE — PROGRESS NOTES
HISTORY OF PRESENT ILLNESS  Lori Gonzalez is a 44 y.o. female. HPI  In for CPE  Recent labs noted  Need referral for her pap/gynecology exam  Vit D def, controlled, recent level was 46  C/o urinary frequency and urinary odor, no fever, no flank pain  Prediabetes, stable, recent a1c was 5.3  BMI 31, gained 10 lbs since last visit, advised to diet and lose weight  Need referral to follow up with Dr Rojas Bowman regarding her PIA  Review of Systems   Constitutional: Negative for chills and fever. Eyes: Negative for blurred vision. Respiratory: Negative for cough, shortness of breath and wheezing. Cardiovascular: Negative for chest pain, palpitations and PND. Gastrointestinal: Negative for abdominal pain, diarrhea, heartburn and nausea. Genitourinary: Negative for dysuria and hematuria. Musculoskeletal: Negative for falls. Neurological: Negative for dizziness, weakness and headaches. Psychiatric/Behavioral: Negative for depression. All other systems reviewed and are negative. Physical Exam  Vitals reviewed. Constitutional:       General: She is not in acute distress. Appearance: She is well-developed. She is not diaphoretic. HENT:      Head: Normocephalic and atraumatic. Right Ear: Tympanic membrane, ear canal and external ear normal.      Left Ear: Tympanic membrane, ear canal and external ear normal.   Eyes:      Extraocular Movements: Extraocular movements intact. Pupils: Pupils are equal, round, and reactive to light. Neck:      Thyroid: No thyromegaly. Vascular: No JVD. Cardiovascular:      Rate and Rhythm: Normal rate and regular rhythm. Heart sounds: Normal heart sounds. No murmur heard. Pulmonary:      Effort: Pulmonary effort is normal.      Breath sounds: Normal breath sounds. No wheezing or rales. Abdominal:      General: Bowel sounds are normal.      Palpations: Abdomen is soft. There is no hepatomegaly, splenomegaly or mass. Tenderness:  There is no abdominal tenderness. Musculoskeletal:         General: Normal range of motion. Cervical back: Normal range of motion and neck supple. Right lower leg: No edema. Left lower leg: No edema. Lymphadenopathy:      Cervical: No cervical adenopathy. Skin:     General: Skin is warm and dry. Findings: No rash. Neurological:      Mental Status: She is alert and oriented to person, place, and time. Gait: Gait normal.   Psychiatric:         Mood and Affect: Mood normal.         Behavior: Behavior normal.         ASSESSMENT and PLAN  Diagnoses and all orders for this visit:    1. Routine general medical examination at a health care facility    2. Vitamin D deficiency, stable    3. Frequency of urination, acute cystitis  -     AMB POC URINALYSIS DIP STICK AUTO W/O MICRO  -     CULTURE, URINE; Future  -     nitrofurantoin, macrocrystal-monohydrate, (Macrobid) 100 mg capsule; Take 1 Capsule by mouth two (2) times a day for 7 days. 4. Pre-diabetes, stable    5.  PIA (obstructive sleep apnea)  -     REFERRAL TO NEUROLOGY    6. Routine gynecological examination  -     REFERRAL TO GYNECOLOGY  Results for orders placed or performed in visit on 08/19/21   AMB POC URINALYSIS DIP STICK AUTO W/O MICRO   Result Value Ref Range    Color (UA POC) Yellow     Clarity (UA POC) Cloudy     Glucose (UA POC) Negative Negative    Bilirubin (UA POC) Negative Negative    Ketones (UA POC) Negative Negative    Specific gravity (UA POC) 1.020 1.001 - 1.035    Blood (UA POC) Negative Negative    pH (UA POC) 7.0 4.6 - 8.0    Protein (UA POC) Negative Negative    Urobilinogen (UA POC) 0.2 mg/dL 0.2 - 1    Nitrites (UA POC) Positive Negative    Leukocyte esterase (UA POC) Trace Negative         Lab Results   Component Value Date/Time    Vitamin D 25-Hydroxy 52.3 08/12/2021 07:24 AM       Lab Results   Component Value Date/Time    Sodium 141 08/12/2021 07:24 AM    Potassium 4.6 08/12/2021 07:24 AM    Chloride 107 08/12/2021 07:24 AM    CO2 32 08/12/2021 07:24 AM    Anion gap 2 (L) 08/12/2021 07:24 AM    Glucose 85 08/12/2021 07:24 AM    BUN 15 08/12/2021 07:24 AM    Creatinine 0.69 08/12/2021 07:24 AM    BUN/Creatinine ratio 22 (H) 08/12/2021 07:24 AM    GFR est AA >60 08/12/2021 07:24 AM    GFR est non-AA >60 08/12/2021 07:24 AM    Calcium 9.8 08/12/2021 07:24 AM    Bilirubin, total 0.4 08/12/2021 07:24 AM    Alk. phosphatase 122 (H) 08/12/2021 07:24 AM    Protein, total 7.2 08/12/2021 07:24 AM    Albumin 4.0 08/12/2021 07:24 AM    Globulin 3.2 08/12/2021 07:24 AM    A-G Ratio 1.3 08/12/2021 07:24 AM    ALT (SGPT) 26 08/12/2021 07:24 AM    AST (SGOT) 19 08/12/2021 07:24 AM     Lab Results   Component Value Date/Time    WBC 8.1 08/12/2021 07:24 AM    WBC 8.2 06/20/2012 09:23 AM    HGB 13.7 08/12/2021 07:24 AM    HCT 42.2 08/12/2021 07:24 AM    PLATELET 152 51/41/0795 07:24 AM    MCV 92.3 08/12/2021 07:24 AM     Follow-up and Dispositions    · Return in about 6 months (around 2/19/2022).        Lab Results   Component Value Date/Time    Hemoglobin A1c 5.3 08/12/2021 07:24 AM

## 2021-08-22 LAB
BACTERIA SPEC CULT: ABNORMAL
CC UR VC: ABNORMAL
SERVICE CMNT-IMP: ABNORMAL

## 2021-10-08 ENCOUNTER — TELEPHONE (OUTPATIENT)
Dept: FAMILY MEDICINE CLINIC | Age: 51
End: 2021-10-08

## 2021-10-08 NOTE — TELEPHONE ENCOUNTER
Called pt on cell, reached self identified vm left msg to call back for results. Pt had negative mammogram 9/23/2021.

## 2021-11-16 LAB — PAP SMEAR, EXTERNAL: NORMAL

## 2021-12-23 DIAGNOSIS — Z99.89 OSA ON CPAP: Primary | ICD-10-CM

## 2021-12-23 DIAGNOSIS — G47.33 OSA ON CPAP: Primary | ICD-10-CM

## 2022-01-04 ENCOUNTER — OFFICE VISIT (OUTPATIENT)
Dept: FAMILY MEDICINE CLINIC | Age: 52
End: 2022-01-04
Payer: OTHER GOVERNMENT

## 2022-01-04 VITALS
RESPIRATION RATE: 16 BRPM | BODY MASS INDEX: 27.48 KG/M2 | HEART RATE: 79 BPM | WEIGHT: 171 LBS | OXYGEN SATURATION: 98 % | TEMPERATURE: 97.5 F | DIASTOLIC BLOOD PRESSURE: 75 MMHG | HEIGHT: 66 IN | SYSTOLIC BLOOD PRESSURE: 113 MMHG

## 2022-01-04 DIAGNOSIS — L98.9 SKIN LESION OF BACK: Primary | ICD-10-CM

## 2022-01-04 PROCEDURE — 99212 OFFICE O/P EST SF 10 MIN: CPT | Performed by: INTERNAL MEDICINE

## 2022-01-04 NOTE — PROGRESS NOTES
HISTORY OF PRESENT ILLNESS  Shama Sanford is a 46 y.o. female. HPI  C/o skin lesion on the left upper back, started 4 months ago, getting bigger, itchy at times, not painful  ROS    Physical Exam  Skin:     Findings: Lesion (round 1x1 cm light brown lesion with irregular borders on the left upper back over the upper scapula.) present. ASSESSMENT and PLAN  Diagnoses and all orders for this visit:    1. Skin lesion of back  -     REFERRAL TO DERMATOLOGY      Follow-up and Dispositions    · Return in about 2 months (around 3/4/2022).

## 2022-01-04 NOTE — PROGRESS NOTES
Ellie Walters is a 46 y.o. female (: 1970) presenting to address:    Chief Complaint   Patient presents with    Skin Problem     lesion on left shoulder       Vitals:    22 1420   BP: 113/75   Pulse: 79   Resp: 16   Temp: 97.5 °F (36.4 °C)   TempSrc: Temporal   SpO2: 98%   Weight: 171 lb (77.6 kg)   Height: 5' 6\" (1.676 m)       Hearing/Vision:   No exam data present    Learning Assessment:     Learning Assessment 2015   PRIMARY LEARNER Patient   HIGHEST LEVEL OF EDUCATION - PRIMARY LEARNER  2 YEARS OF COLLEGE   BARRIERS PRIMARY LEARNER NONE   CO-LEARNER CAREGIVER No   PRIMARY LANGUAGE ENGLISH    NEED No   LEARNER PREFERENCE PRIMARY READING   LEARNING SPECIAL TOPICS none   ANSWERED BY patient   RELATIONSHIP SELF   ASSESSMENT COMMENT n/a     Depression Screening:     3 most recent PHQ Screens 2022   Little interest or pleasure in doing things Not at all   Feeling down, depressed, irritable, or hopeless Not at all   Total Score PHQ 2 0     Fall Risk Assessment:     Fall Risk Assessment, last 12 mths 2021   Able to walk? Yes   Fall in past 12 months? 0   Do you feel unsteady? 0   Are you worried about falling 0     Abuse Screening:     Abuse Screening Questionnaire 2021   Do you ever feel afraid of your partner? N   Are you in a relationship with someone who physically or mentally threatens you? N   Is it safe for you to go home? Y     ADL Assessment:   No flowsheet data found. Coordination of Care Questionaire:   1. \"Have you been to the ER, urgent care clinic since your last visit? Hospitalized since your last visit? \" No    2. \"Have you seen or consulted any other health care providers outside of the [address] [place] [address] since your last visit? \" No     3. For patients aged 39-70: Has the patient had a colonoscopy? Yes, HM satisfied with blue hyperlink     If the patient is female:    4.  For patients aged 41-77: Has the patient had a mammogram within the past 2 years? Yes, HM satisfied with blue hyperlink    5. For patients aged 21-65: Has the patient had a pap smear? Yes, HM satisfied with blue hyperlink    Advanced Directive:   1. Do you have an Advanced Directive? NO    2. Would you like information on Advanced Directives?  NO

## 2022-03-18 PROBLEM — Z87.19 HISTORY OF DIVERTICULITIS: Status: ACTIVE | Noted: 2017-03-30

## 2022-03-18 PROBLEM — I10 ESSENTIAL HYPERTENSION: Status: ACTIVE | Noted: 2017-02-07

## 2022-03-19 PROBLEM — G47.33 OSA ON CPAP: Status: ACTIVE | Noted: 2018-10-24

## 2022-03-19 PROBLEM — Z99.89 OSA ON CPAP: Status: ACTIVE | Noted: 2018-10-24

## 2022-03-19 PROBLEM — E55.9 VITAMIN D DEFICIENCY: Status: ACTIVE | Noted: 2017-02-07

## 2022-03-19 PROBLEM — Z98.84 S/P GASTRIC BYPASS: Status: ACTIVE | Noted: 2017-08-03

## 2022-06-13 ENCOUNTER — VIRTUAL VISIT (OUTPATIENT)
Dept: FAMILY MEDICINE CLINIC | Age: 52
End: 2022-06-13
Payer: OTHER GOVERNMENT

## 2022-06-13 DIAGNOSIS — R05.9 COUGH: Primary | ICD-10-CM

## 2022-06-13 PROCEDURE — 99213 OFFICE O/P EST LOW 20 MIN: CPT | Performed by: INTERNAL MEDICINE

## 2022-06-13 RX ORDER — BENZONATATE 200 MG/1
200 CAPSULE ORAL
Qty: 30 CAPSULE | Refills: 0 | Status: SHIPPED | OUTPATIENT
Start: 2022-06-13

## 2022-06-13 RX ORDER — ALBUTEROL SULFATE 90 UG/1
2 AEROSOL, METERED RESPIRATORY (INHALATION)
Qty: 18 G | Refills: 0 | Status: SHIPPED | OUTPATIENT
Start: 2022-06-13

## 2022-06-13 RX ORDER — DOXYCYCLINE 100 MG/1
100 CAPSULE ORAL 2 TIMES DAILY
Qty: 14 CAPSULE | Refills: 0 | Status: SHIPPED | OUTPATIENT
Start: 2022-06-13 | End: 2022-06-20

## 2022-06-13 NOTE — PROGRESS NOTES
Mouna Saez is a 46 y.o. female who was seen by synchronous (real-time) audio-video technology on 6/13/2022 for Cough        Assessment & Plan:   Diagnoses and all orders for this visit:    1. Cough, ? Acute Bronchitis  -     benzonatate (TESSALON) 200 mg capsule; Take 1 Capsule by mouth three (3) times daily as needed for Cough. -     albuterol (PROVENTIL HFA, VENTOLIN HFA, PROAIR HFA) 90 mcg/actuation inhaler; Take 2 Puffs by inhalation every four (4) hours as needed for Wheezing or Cough. -     doxycycline (MONODOX) 100 mg capsule; Take 1 Capsule by mouth two (2) times a day for 7 days. Advised to go to urgent care if not better          712  Subjective:   C/o started having covid symptoms on 5-18-22, tested positive on 5-21-22, improved, but she is still having cough, now coughing slight yellow sputum, associated with mild wheezing on/off, no SOB, she tried mucinex DM during the day and nyquil qhs which helped some but did not resolve her cough  No fever or chills  No S/T    Prior to Admission medications    Medication Sig Start Date End Date Taking? Authorizing Provider   benzonatate (TESSALON) 200 mg capsule Take 1 Capsule by mouth three (3) times daily as needed for Cough. 6/13/22  Yes Yocasta Villa MD   albuterol (PROVENTIL HFA, VENTOLIN HFA, PROAIR HFA) 90 mcg/actuation inhaler Take 2 Puffs by inhalation every four (4) hours as needed for Wheezing or Cough. 6/13/22  Yes Rodri RAMAN MD   doxycycline (MONODOX) 100 mg capsule Take 1 Capsule by mouth two (2) times a day for 7 days. 6/13/22 6/20/22 Yes Terrence Gupta MD   cyanocobalamin 1,000 mcg tablet Take 1,000 mcg by mouth. Every other day   Yes Provider, Historical   CALCIUM CARBONATE (CALCIUM 500 PO) Take 1,000 mg by mouth daily. Yes Provider, Historical   multivitamins chew Take  by mouth daily. Yes Provider, Historical   cholecalciferol, VITAMIN D3, (VITAMIN D3) 5,000 unit tab tablet Take  by mouth daily.    Yes Provider, Historical     Patient Active Problem List    Diagnosis Date Noted    PIA on CPAP 10/24/2018    S/P gastric bypass 08/03/2017    History of diverticulitis 03/30/2017    Essential hypertension 02/07/2017    Vitamin D deficiency 02/07/2017    Stress incontinence     Acute cystitis without hematuria     SVT (supraventricular tachycardia) ([address]     Congenital abnormality of ureter     Obesity     Pre-diabetes 03/16/2016    Factor V Leiden mutation ([address] 11/17/2014    Recurrent UTI 10/28/2013    Other and unspecified hyperlipidemia 06/20/2012     Past Medical History:   Diagnosis Date    Acute cystitis without hematuria     Adverse effect of anesthesia     Arrhythmia 2011    SVT    Benign bladder mass     Congenital abnormality of ureter     [facility]     Factor V Leiden ([address]     Morbid obesity ([address] 12/4/2018    S/p surgery, resolved    Obesity     Other pulmonary embolism without acute cor pulmonale ([address] 12/4/2018    Resolved     Pulmonary air embolism ([address]     Recurrent UTI 10/28/2013    Stress incontinence     HOLLY (stress urinary incontinence, female)     SVT (supraventricular tachycardia) ([address]     Thromboembolus ([address] 03/2012    PULMONARY EMBOLISM AFTER EP STUDY FACTOR V    UTI (urinary tract infection)        Review of Systems   Constitutional: Negative for chills and fever. HENT: Negative for sore throat. Respiratory: Positive for cough. Objective:   No flowsheet data found. General: alert, cooperative, no distress   Mental  status: normal mood, behavior, speech, dress, motor activity, and thought processes, able to follow commands   HENT: NCAT   Neck: no visualized mass   Resp: no respiratory distress   Neuro: no gross deficits   Skin: no discoloration or lesions of concern on visible areas   Psychiatric: normal affect, consistent with stated mood, no evidence of hallucinations     Additional exam findings:        We discussed the expected course, resolution and complications of the diagnosis(es) in detail. Medication risks, benefits, costs, interactions, and alternatives were discussed as indicated. I advised her to contact the office if her condition worsens, changes or fails to improve as anticipated. She expressed understanding with the diagnosis(es) and plan. Eb Leach, was evaluated through a synchronous (real-time) audio-video encounter. The patient (or guardian if applicable) is aware that this is a billable service, which includes applicable co-pays. This Virtual Visit was conducted with patient's (and/or legal guardian's) consent. The visit was conducted pursuant to the emergency declaration under the [address] [place] [address] authority and the [employer] and [employer] Act. Patient identification was verified, and a caregiver was present when appropriate. The patient was located at: Other: work. The provider was located at:  Facility ([facility] Department): [facility] [address]          Balta Estrada MD

## 2022-11-11 ENCOUNTER — HOSPITAL ENCOUNTER (OUTPATIENT)
Dept: LAB | Age: 52
Discharge: HOME | End: 2022-11-11
Payer: OTHER GOVERNMENT

## 2022-11-11 ENCOUNTER — APPOINTMENT (OUTPATIENT)
Dept: FAMILY MEDICINE CLINIC | Age: 52
End: 2022-11-11

## 2022-11-11 ENCOUNTER — OFFICE VISIT (OUTPATIENT)
Dept: FAMILY MEDICINE CLINIC | Age: 52
End: 2022-11-11
Payer: OTHER GOVERNMENT

## 2022-11-11 VITALS
BODY MASS INDEX: 28.67 KG/M2 | WEIGHT: 178.4 LBS | TEMPERATURE: 98.1 F | DIASTOLIC BLOOD PRESSURE: 74 MMHG | HEART RATE: 75 BPM | SYSTOLIC BLOOD PRESSURE: 111 MMHG | HEIGHT: 66 IN | RESPIRATION RATE: 16 BRPM | OXYGEN SATURATION: 96 %

## 2022-11-11 DIAGNOSIS — E55.9 VITAMIN D DEFICIENCY: ICD-10-CM

## 2022-11-11 DIAGNOSIS — M54.2 NECK PAIN: Primary | ICD-10-CM

## 2022-11-11 DIAGNOSIS — R73.03 PRE-DIABETES: ICD-10-CM

## 2022-11-11 DIAGNOSIS — E78.2 MIXED HYPERLIPIDEMIA: ICD-10-CM

## 2022-11-11 DIAGNOSIS — Z98.84 S/P GASTRIC BYPASS: ICD-10-CM

## 2022-11-11 DIAGNOSIS — M50.20 HNP (HERNIATED NUCLEUS PULPOSUS), CERVICAL: ICD-10-CM

## 2022-11-11 LAB
25(OH)D3 SERPL-MCNC: 36.8 NG/ML (ref 30–100)
ALBUMIN SERPL-MCNC: 4.2 G/DL (ref 3.4–5)
ALBUMIN/GLOB SERPL: 1.4 {RATIO} (ref 0.8–1.7)
ALP SERPL-CCNC: 104 U/L (ref 45–117)
ALT SERPL-CCNC: 28 U/L (ref 13–56)
ANION GAP SERPL CALC-SCNC: 4 MMOL/L (ref 3–18)
AST SERPL-CCNC: 19 U/L (ref 10–38)
BASOPHILS # BLD: 0 K/UL (ref 0–0.1)
BASOPHILS NFR BLD: 1 % (ref 0–2)
BILIRUB SERPL-MCNC: 0.5 MG/DL (ref 0.2–1)
BUN SERPL-MCNC: 17 MG/DL (ref 7–18)
BUN/CREAT SERPL: 23 (ref 12–20)
CALCIUM SERPL-MCNC: 9.8 MG/DL (ref 8.5–10.1)
CHLORIDE SERPL-SCNC: 106 MMOL/L (ref 100–111)
CHOLEST SERPL-MCNC: 209 MG/DL
CO2 SERPL-SCNC: 31 MMOL/L (ref 21–32)
CREAT SERPL-MCNC: 0.73 MG/DL (ref 0.6–1.3)
DIFFERENTIAL METHOD BLD: NORMAL
EOSINOPHIL # BLD: 0.1 K/UL (ref 0–0.4)
EOSINOPHIL NFR BLD: 2 % (ref 0–5)
ERYTHROCYTE [DISTWIDTH] IN BLOOD BY AUTOMATED COUNT: 12.4 % (ref 11.6–14.5)
EST. AVERAGE GLUCOSE BLD GHB EST-MCNC: 105 MG/DL
GLOBULIN SER CALC-MCNC: 3 G/DL (ref 2–4)
GLUCOSE SERPL-MCNC: 84 MG/DL (ref 74–99)
HBA1C MFR BLD: 5.3 % (ref 4.2–5.6)
HCT VFR BLD AUTO: 42.2 % (ref 35–45)
HDLC SERPL-MCNC: 64 MG/DL (ref 40–60)
HDLC SERPL: 3.3 {RATIO} (ref 0–5)
HGB BLD-MCNC: 13.7 G/DL (ref 12–16)
IMM GRANULOCYTES # BLD AUTO: 0 K/UL (ref 0–0.04)
IMM GRANULOCYTES NFR BLD AUTO: 0 % (ref 0–0.5)
IRON SERPL-MCNC: 110 UG/DL (ref 50–175)
LDLC SERPL CALC-MCNC: 125.4 MG/DL (ref 0–100)
LIPID PROFILE,FLP: ABNORMAL
LYMPHOCYTES # BLD: 2.5 K/UL (ref 0.9–3.6)
LYMPHOCYTES NFR BLD: 36 % (ref 21–52)
MCH RBC QN AUTO: 29.6 PG (ref 24–34)
MCHC RBC AUTO-ENTMCNC: 32.5 G/DL (ref 31–37)
MCV RBC AUTO: 91.1 FL (ref 78–100)
MONOCYTES # BLD: 0.6 K/UL (ref 0.05–1.2)
MONOCYTES NFR BLD: 9 % (ref 3–10)
NEUTS SEG # BLD: 3.6 K/UL (ref 1.8–8)
NEUTS SEG NFR BLD: 52 % (ref 40–73)
NRBC # BLD: 0 K/UL (ref 0–0.01)
NRBC BLD-RTO: 0 PER 100 WBC
PLATELET # BLD AUTO: 339 K/UL (ref 135–420)
PMV BLD AUTO: 10.6 FL (ref 9.2–11.8)
POTASSIUM SERPL-SCNC: 4.9 MMOL/L (ref 3.5–5.5)
PROT SERPL-MCNC: 7.2 G/DL (ref 6.4–8.2)
RBC # BLD AUTO: 4.63 M/UL (ref 4.2–5.3)
SODIUM SERPL-SCNC: 141 MMOL/L (ref 136–145)
TRIGL SERPL-MCNC: 98 MG/DL (ref ?–150)
VIT B12 SERPL-MCNC: 1120 PG/ML (ref 211–911)
VLDLC SERPL CALC-MCNC: 19.6 MG/DL
WBC # BLD AUTO: 6.8 K/UL (ref 4.6–13.2)

## 2022-11-11 PROCEDURE — 82306 VITAMIN D 25 HYDROXY: CPT

## 2022-11-11 PROCEDURE — 80061 LIPID PANEL: CPT

## 2022-11-11 PROCEDURE — 3078F DIAST BP <80 MM HG: CPT | Performed by: INTERNAL MEDICINE

## 2022-11-11 PROCEDURE — 83036 HEMOGLOBIN GLYCOSYLATED A1C: CPT

## 2022-11-11 PROCEDURE — 99214 OFFICE O/P EST MOD 30 MIN: CPT | Performed by: INTERNAL MEDICINE

## 2022-11-11 PROCEDURE — 3074F SYST BP LT 130 MM HG: CPT | Performed by: INTERNAL MEDICINE

## 2022-11-11 PROCEDURE — 85025 COMPLETE CBC W/AUTO DIFF WBC: CPT

## 2022-11-11 PROCEDURE — 83540 ASSAY OF IRON: CPT

## 2022-11-11 PROCEDURE — 82607 VITAMIN B-12: CPT

## 2022-11-11 PROCEDURE — 80053 COMPREHEN METABOLIC PANEL: CPT

## 2022-11-11 PROCEDURE — 36415 COLL VENOUS BLD VENIPUNCTURE: CPT

## 2022-11-11 RX ORDER — LIDOCAINE 50 MG/G
5 PATCH TOPICAL
COMMUNITY
Start: 2022-11-07 | End: 2022-12-07

## 2022-11-11 RX ORDER — METHOCARBAMOL 500 MG/1
500 TABLET, FILM COATED ORAL
Qty: 50 TABLET | Refills: 0 | Status: SHIPPED | OUTPATIENT
Start: 2022-11-11

## 2022-11-11 RX ORDER — ACETAMINOPHEN 500 MG
500 TABLET ORAL
COMMUNITY
Start: 2022-11-07 | End: 2022-11-12

## 2022-11-11 NOTE — PROGRESS NOTES
HISTORY OF PRESENT ILLNESS  Susan Wright is a 46 y.o. female. HPI  Neck pain, started 2 months ago, not any better, 4-5/10, no radiation to the arms, no arms weakness or tingling. HNP of C spine:, pt stated she tripped on a concrete barrier 4 days ago and fell, went to the ER @ [address], had xrays and ct scans, results are not available today, pt was called later and told that she has Herniated disks in her neck and need further evaluation by NS, her neck pain is not worse after her recent fall, she is taking tylenol as needed for pain but not improving, she can not take NSAID's due to S/P Gastric bypass. Vit d def, stable on vit d daily  Prediabetes, stable, last a1c was  HLD, ? Control, she is on diet only, will repeat labs  S/p gastric bypass, she gained 7 lbs since last visit, advised to diet and lose weight  Review of Systems   Constitutional:  Negative for fever. Respiratory:  Negative for cough, shortness of breath and wheezing. Cardiovascular:  Negative for chest pain and leg swelling. Gastrointestinal:  Negative for abdominal pain and nausea. Neurological:  Negative for dizziness, tingling, focal weakness, loss of consciousness and headaches. Physical Exam  Vitals reviewed. Cardiovascular:      Rate and Rhythm: Normal rate and regular rhythm. Heart sounds: No murmur heard. Pulmonary:      Effort: Pulmonary effort is normal.      Breath sounds: Normal breath sounds. Abdominal:      General: Bowel sounds are normal.      Palpations: Abdomen is soft. Tenderness: There is no abdominal tenderness. Musculoskeletal:      Cervical back: Spasms and tenderness (B/L paraspinal.) present. No swelling, erythema, rigidity or bony tenderness. Normal range of motion. Right lower leg: No edema. Left lower leg: No edema. ASSESSMENT and PLAN  Diagnoses and all orders for this visit:    1.  Neck pain  -     REFERRAL TO NEUROSURGERY  -     methocarbamoL (ROBAXIN) 500 mg tablet; Take 1 Tablet by mouth three (3) times daily as needed for Muscle Spasm(s). - pt declined referral to PT for now, wants to wait until seen by NS    2. Vitamin D deficiency, stable  -     VITAMIN D, 25 HYDROXY; Future    3. Pre-diabetes, stable  -     METABOLIC PANEL, COMPREHENSIVE; Future  -     HEMOGLOBIN A1C WITH EAG; Future    4. Mixed hyperlipidemia, ? Control, will repeat labs  -     LIPID PANEL; Future  -     CBC WITH AUTOMATED DIFF; Future  -     METABOLIC PANEL, COMPREHENSIVE; Future    5. HNP (herniated nucleus pulposus), cervical  -     REFERRAL TO NEUROSURGERY  - will obtain copy of her cervical spin ct scan and other xrays done by the ER. 6. S/P gastric bypass, advised to diet and lose weight  -     VITAMIN B12; Future  -     IRON; Future  Lab Results   Component Value Date/Time    Cholesterol, total 214 (H) 08/12/2021 07:24 AM    HDL Cholesterol 62 (H) 08/12/2021 07:24 AM    LDL, calculated 125.8 (H) 08/12/2021 07:24 AM    VLDL, calculated 26.2 08/12/2021 07:24 AM    Triglyceride 131 08/12/2021 07:24 AM    CHOL/HDL Ratio 3.5 08/12/2021 07:24 AM       Follow-up and Dispositions    Return in about 6 months (around 5/11/2023).

## 2022-11-11 NOTE — PROGRESS NOTES
Alan Pearson is a 46 y.o. female (: 1970) presenting to address:    Chief Complaint   Patient presents with    Hospital Follow Up       Vitals:    22 0856   BP: 111/74   Pulse: 75   Resp: 16   Temp: 98.1 °F (36.7 °C)   TempSrc: Temporal   SpO2: 96%   Weight: 178 lb 6.4 oz (80.9 kg)   Height: 5' 6\" (1.676 m)   PainSc:   0 - No pain       Hearing/Vision:   No results found. Learning Assessment:     Learning Assessment 2015   PRIMARY LEARNER Patient   HIGHEST LEVEL OF EDUCATION - PRIMARY LEARNER  2 YEARS OF COLLEGE   BARRIERS PRIMARY LEARNER NONE   CO-LEARNER CAREGIVER No   PRIMARY LANGUAGE ENGLISH    NEED No   LEARNER PREFERENCE PRIMARY READING   LEARNING SPECIAL TOPICS none   ANSWERED BY patient   RELATIONSHIP SELF   ASSESSMENT COMMENT n/a     Depression Screening:     3 most recent PHQ Screens 2022   Little interest or pleasure in doing things Not at all   Feeling down, depressed, irritable, or hopeless Not at all   Total Score PHQ 2 0     Fall Risk Assessment:     Fall Risk Assessment, last 12 mths 2022   Able to walk? Yes   Fall in past 12 months? 1   Do you feel unsteady? 0   Are you worried about falling 0   Is the gait abnormal? 0   Number of falls in past 12 months 1   Fall with injury? 1     Abuse Screening:     Abuse Screening Questionnaire 2022   Do you ever feel afraid of your partner? N   Are you in a relationship with someone who physically or mentally threatens you? N   Is it safe for you to go home? Y     ADL Assessment:   No flowsheet data found. Coordination of Care Questionaire:   1. \"Have you been to the ER, urgent care clinic since your last visit? Hospitalized since your last visit? \" Yes Where: [facility] Reason for visit: Concussion    2. \"Have you seen or consulted any other health care providers outside of the [address] [place] [address] since your last visit? \" No     3.  For patients aged 39-70: Has the patient had a colonoscopy? Yes - no Care Gap present     If the patient is female:    4. For patients aged 41-77: Has the patient had a mammogram within the past 2 years? Yes - no Care Gap present    5. For patients aged 21-65: Has the patient had a pap smear? Yes - no Care Gap present    Advanced Directive:   1. Do you have an Advanced Directive? NO    2. Would you like information on Advanced Directives?  NO

## 2022-11-12 NOTE — PROGRESS NOTES
A1c is normal  Cholesterol is slightly elevated, similar to last labs, need low cholesterol diet  Iron and Vit D are normal, Hgb is normal  Vit B12 is slightly elevated  Please fax copy to Bariatric surgery.

## 2022-11-15 NOTE — PROGRESS NOTES
Patient advised of her results. She would like to know would you like for her to cut back some more on the B12?  Also, patient stated that she no longer see Dr. Patria Peng he is retired

## 2022-12-22 LAB — MAMMOGRAPHY, EXTERNAL: NORMAL

## 2022-12-23 DIAGNOSIS — G89.29 CHRONIC LOW BACK PAIN, UNSPECIFIED BACK PAIN LATERALITY, UNSPECIFIED WHETHER SCIATICA PRESENT: Primary | ICD-10-CM

## 2022-12-23 DIAGNOSIS — M54.2 CERVICALGIA: ICD-10-CM

## 2022-12-23 DIAGNOSIS — M54.50 CHRONIC LOW BACK PAIN, UNSPECIFIED BACK PAIN LATERALITY, UNSPECIFIED WHETHER SCIATICA PRESENT: Primary | ICD-10-CM

## 2023-01-11 ENCOUNTER — TELEPHONE (OUTPATIENT)
Dept: FAMILY MEDICINE CLINIC | Age: 53
End: 2023-01-11

## 2023-01-11 NOTE — TELEPHONE ENCOUNTER
Pt would like a call back for additional info that Corrine Wilder is requesting for an MRI that pt is scheduled for this Friday. Pt stated that she has the information needed and she would like a call back today if possible.  Please advise

## 2023-01-11 NOTE — TELEPHONE ENCOUNTER
Pt would like a call back for additional info that Luana is requesting for an MRI that pt is scheduled for this Friday. Pt stated that she has the information needed and she would like a call back today if possible. Please advise

## 2023-05-11 ENCOUNTER — HOSPITAL ENCOUNTER (OUTPATIENT)
Facility: HOSPITAL | Age: 53
Setting detail: SPECIMEN
Discharge: HOME | End: 2023-05-11
Payer: OTHER GOVERNMENT

## 2023-05-11 ENCOUNTER — OFFICE VISIT (OUTPATIENT)
Dept: FAMILY MEDICINE CLINIC | Facility: CLINIC | Age: 53
End: 2023-05-11
Payer: OTHER GOVERNMENT

## 2023-05-11 VITALS
SYSTOLIC BLOOD PRESSURE: 126 MMHG | RESPIRATION RATE: 17 BRPM | HEART RATE: 84 BPM | DIASTOLIC BLOOD PRESSURE: 82 MMHG | OXYGEN SATURATION: 96 % | TEMPERATURE: 97.8 F | HEIGHT: 65 IN | BODY MASS INDEX: 30.32 KG/M2 | WEIGHT: 182 LBS

## 2023-05-11 DIAGNOSIS — Z00.00 ROUTINE GENERAL MEDICAL EXAMINATION AT A HEALTH CARE FACILITY: ICD-10-CM

## 2023-05-11 DIAGNOSIS — E55.9 VITAMIN D DEFICIENCY, UNSPECIFIED: ICD-10-CM

## 2023-05-11 DIAGNOSIS — Z98.84 BARIATRIC SURGERY STATUS: ICD-10-CM

## 2023-05-11 DIAGNOSIS — R73.03 PREDIABETES: ICD-10-CM

## 2023-05-11 DIAGNOSIS — E78.2 MIXED HYPERLIPIDEMIA: ICD-10-CM

## 2023-05-11 DIAGNOSIS — Z00.00 ROUTINE GENERAL MEDICAL EXAMINATION AT A HEALTH CARE FACILITY: Primary | ICD-10-CM

## 2023-05-11 LAB
25(OH)D3 SERPL-MCNC: 39.2 NG/ML (ref 30–100)
ALBUMIN SERPL-MCNC: 4.3 G/DL (ref 3.4–5)
ALBUMIN/GLOB SERPL: 1.3 (ref 0.8–1.7)
ALP SERPL-CCNC: 117 U/L (ref 45–117)
ALT SERPL-CCNC: 25 U/L (ref 13–56)
ANION GAP SERPL CALC-SCNC: 6 MMOL/L (ref 3–18)
AST SERPL-CCNC: 19 U/L (ref 10–38)
BASOPHILS # BLD: 0 K/UL (ref 0–0.1)
BASOPHILS NFR BLD: 0 % (ref 0–2)
BILIRUB SERPL-MCNC: 0.4 MG/DL (ref 0.2–1)
BUN SERPL-MCNC: 15 MG/DL (ref 7–18)
BUN/CREAT SERPL: 22 (ref 12–20)
CALCIUM SERPL-MCNC: 10 MG/DL (ref 8.5–10.1)
CHLORIDE SERPL-SCNC: 107 MMOL/L (ref 100–111)
CHOLEST SERPL-MCNC: 217 MG/DL
CO2 SERPL-SCNC: 29 MMOL/L (ref 21–32)
CREAT SERPL-MCNC: 0.69 MG/DL (ref 0.6–1.3)
DIFFERENTIAL METHOD BLD: NORMAL
EOSINOPHIL # BLD: 0.2 K/UL (ref 0–0.4)
EOSINOPHIL NFR BLD: 2 % (ref 0–5)
ERYTHROCYTE [DISTWIDTH] IN BLOOD BY AUTOMATED COUNT: 12.8 % (ref 11.6–14.5)
EST. AVERAGE GLUCOSE BLD GHB EST-MCNC: 103 MG/DL
GLOBULIN SER CALC-MCNC: 3.2 G/DL (ref 2–4)
GLUCOSE SERPL-MCNC: 91 MG/DL (ref 74–99)
HBA1C MFR BLD: 5.2 % (ref 4.2–5.6)
HCT VFR BLD AUTO: 41.6 % (ref 35–45)
HDLC SERPL-MCNC: 68 MG/DL (ref 40–60)
HDLC SERPL: 3.2 (ref 0–5)
HGB BLD-MCNC: 13.6 G/DL (ref 12–16)
IMM GRANULOCYTES # BLD AUTO: 0 K/UL (ref 0–0.04)
IMM GRANULOCYTES NFR BLD AUTO: 0 % (ref 0–0.5)
LDLC SERPL CALC-MCNC: 125.6 MG/DL (ref 0–100)
LIPID PANEL: ABNORMAL
LYMPHOCYTES # BLD: 2.4 K/UL (ref 0.9–3.6)
LYMPHOCYTES NFR BLD: 33 % (ref 21–52)
MCH RBC QN AUTO: 30 PG (ref 24–34)
MCHC RBC AUTO-ENTMCNC: 32.7 G/DL (ref 31–37)
MCV RBC AUTO: 91.8 FL (ref 78–100)
MONOCYTES # BLD: 0.6 K/UL (ref 0.05–1.2)
MONOCYTES NFR BLD: 8 % (ref 3–10)
NEUTS SEG # BLD: 4.1 K/UL (ref 1.8–8)
NEUTS SEG NFR BLD: 56 % (ref 40–73)
NRBC # BLD: 0 K/UL (ref 0–0.01)
NRBC BLD-RTO: 0 PER 100 WBC
PLATELET # BLD AUTO: 340 K/UL (ref 135–420)
PMV BLD AUTO: 10.8 FL (ref 9.2–11.8)
POTASSIUM SERPL-SCNC: 4.4 MMOL/L (ref 3.5–5.5)
PROT SERPL-MCNC: 7.5 G/DL (ref 6.4–8.2)
RBC # BLD AUTO: 4.53 M/UL (ref 4.2–5.3)
SODIUM SERPL-SCNC: 142 MMOL/L (ref 136–145)
TRIGL SERPL-MCNC: 117 MG/DL
VIT B12 SERPL-MCNC: 1337 PG/ML (ref 211–911)
VLDLC SERPL CALC-MCNC: 23.4 MG/DL
WBC # BLD AUTO: 7.3 K/UL (ref 4.6–13.2)

## 2023-05-11 PROCEDURE — 82306 VITAMIN D 25 HYDROXY: CPT

## 2023-05-11 PROCEDURE — 80053 COMPREHEN METABOLIC PANEL: CPT

## 2023-05-11 PROCEDURE — 99396 PREV VISIT EST AGE 40-64: CPT | Performed by: INTERNAL MEDICINE

## 2023-05-11 PROCEDURE — 83036 HEMOGLOBIN GLYCOSYLATED A1C: CPT

## 2023-05-11 PROCEDURE — 36415 COLL VENOUS BLD VENIPUNCTURE: CPT

## 2023-05-11 PROCEDURE — 80061 LIPID PANEL: CPT

## 2023-05-11 PROCEDURE — 3079F DIAST BP 80-89 MM HG: CPT | Performed by: INTERNAL MEDICINE

## 2023-05-11 PROCEDURE — 82607 VITAMIN B-12: CPT

## 2023-05-11 PROCEDURE — 3074F SYST BP LT 130 MM HG: CPT | Performed by: INTERNAL MEDICINE

## 2023-05-11 PROCEDURE — 85025 COMPLETE CBC W/AUTO DIFF WBC: CPT

## 2023-05-11 PROCEDURE — 84443 ASSAY THYROID STIM HORMONE: CPT

## 2023-05-11 SDOH — ECONOMIC STABILITY: HOUSING INSECURITY
IN THE LAST 12 MONTHS, WAS THERE A TIME WHEN YOU DID NOT HAVE A STEADY PLACE TO SLEEP OR SLEPT IN A SHELTER (INCLUDING NOW)?: NO

## 2023-05-11 SDOH — ECONOMIC STABILITY: INCOME INSECURITY: HOW HARD IS IT FOR YOU TO PAY FOR THE VERY BASICS LIKE FOOD, HOUSING, MEDICAL CARE, AND HEATING?: NOT HARD AT ALL

## 2023-05-11 SDOH — ECONOMIC STABILITY: FOOD INSECURITY: WITHIN THE PAST 12 MONTHS, THE FOOD YOU BOUGHT JUST DIDN'T LAST AND YOU DIDN'T HAVE MONEY TO GET MORE.: NEVER TRUE

## 2023-05-11 SDOH — ECONOMIC STABILITY: FOOD INSECURITY: WITHIN THE PAST 12 MONTHS, YOU WORRIED THAT YOUR FOOD WOULD RUN OUT BEFORE YOU GOT MONEY TO BUY MORE.: NEVER TRUE

## 2023-05-11 ASSESSMENT — ENCOUNTER SYMPTOMS
VOMITING: 0
WHEEZING: 0
SHORTNESS OF BREATH: 0
DIARRHEA: 0
NAUSEA: 0
COUGH: 0
EYE PAIN: 0
ABDOMINAL PAIN: 0

## 2023-05-11 ASSESSMENT — PATIENT HEALTH QUESTIONNAIRE - PHQ9
SUM OF ALL RESPONSES TO PHQ9 QUESTIONS 1 & 2: 0
SUM OF ALL RESPONSES TO PHQ QUESTIONS 1-9: 0
1. LITTLE INTEREST OR PLEASURE IN DOING THINGS: 0
SUM OF ALL RESPONSES TO PHQ QUESTIONS 1-9: 0
SUM OF ALL RESPONSES TO PHQ QUESTIONS 1-9: 0
2. FEELING DOWN, DEPRESSED OR HOPELESS: 0
SUM OF ALL RESPONSES TO PHQ QUESTIONS 1-9: 0

## 2023-05-11 NOTE — PROGRESS NOTES
Jose Sharp is a 45 y.o. female (: 1970) presenting to address:    Chief Complaint   Patient presents with    Medication Check       Vitals:    23 0826   BP: 126/82   Pulse: 84   Resp: 17   Temp: 97.8 °F (36.6 °C)   SpO2: 96%       Coordination of Care Questionaire:   1. \"Have you been to the ER, urgent care clinic since your last visit? Hospitalized since your last visit? \" No    2. \"Have you seen or consulted any other health care providers outside of the [address] [place] [address] since your last visit? \" No     3. For patients aged 39-70: Has the patient had a colonoscopy / FIT/ Cologuard? Yes - no Care Gap present      If the patient is female:    4. For patients aged 41-77: Has the patient had a mammogram within the past 2 years? Yes - no Care Gap present      5. For patients aged 21-65: Has the patient had a pap smear? Yes - no Care Gap present    Advanced Directive:   1. Do you have an Advanced Directive? No    2. Would you like information on Advanced Directives?  No

## 2023-05-11 NOTE — PROGRESS NOTES
HISTORY OF PRESENT ILLNESS  Garry Pruett is a 45 y.o. female    HPI    In for cpe  HLD, on low cholesterol diet  Prediabetes, stable, last A1c was 5.3  Vit d def, stable on vit d daily  S/p gastric bypass, BMI 30.76 today, advised to diet and lose weight  Review of Systems   Constitutional:  Negative for chills, fatigue and fever. HENT:  Negative for congestion. Eyes:  Negative for pain. Respiratory:  Negative for cough, shortness of breath and wheezing. Cardiovascular:  Negative for chest pain, palpitations and leg swelling. Gastrointestinal:  Negative for abdominal pain, diarrhea, nausea and vomiting. Genitourinary:  Negative for dysuria. Skin:  Negative for rash. Neurological:  Negative for dizziness, weakness, numbness and headaches. Psychiatric/Behavioral:  Negative for dysphoric mood. The patient is not nervous/anxious. Physical Exam  Vitals reviewed. Constitutional:       Appearance: Normal appearance. HENT:      Head: Normocephalic and atraumatic. Right Ear: Tympanic membrane, ear canal and external ear normal.      Left Ear: Tympanic membrane, ear canal and external ear normal.      Nose: Nose normal.      Mouth/Throat:      Pharynx: No oropharyngeal exudate or posterior oropharyngeal erythema. Eyes:      Extraocular Movements: Extraocular movements intact. Conjunctiva/sclera: Conjunctivae normal.      Pupils: Pupils are equal, round, and reactive to light. Neck:      Vascular: No carotid bruit. Cardiovascular:      Rate and Rhythm: Normal rate and regular rhythm. Pulses: Normal pulses. Heart sounds: Normal heart sounds. No murmur heard. Pulmonary:      Effort: Pulmonary effort is normal. No respiratory distress. Breath sounds: Normal breath sounds. No wheezing or rales. Chest:      Chest wall: No tenderness. Abdominal:      General: Bowel sounds are normal.      Palpations: Abdomen is soft. There is no mass. Tenderness:  There is

## 2023-05-12 LAB — TSH SERPL DL<=0.05 MIU/L-ACNC: 2.32 UIU/ML (ref 0.45–4.5)

## 2023-07-17 ENCOUNTER — TELEPHONE (OUTPATIENT)
Dept: FAMILY MEDICINE CLINIC | Facility: CLINIC | Age: 53
End: 2023-07-17

## 2023-07-17 NOTE — TELEPHONE ENCOUNTER
Pt requesting updated referral to Neuro. Please advise, thank you.     Dr Chantel Stroud [facility]   [address] [place] [address] [id number]  [facility]  [phone]

## 2023-07-17 NOTE — TELEPHONE ENCOUNTER
----- Message from Nathan Mitchell sent at 7/17/2023  3:51 PM EDT -----  Regarding: Annual visit to Dr James San Neurologist  Contact: [phone]  I am currently using the CPAP and must do my annual follow up with Dr Walter Shoemaker to update my supplies prescriptions. Can you send a referral to [facility] for me please? Dr James San [facility]   [address] [place] [address] [phone]  [facility]  [phone]  Thanks!   [facility]

## 2023-07-23 DIAGNOSIS — G47.33 OSA ON CPAP: Primary | ICD-10-CM

## 2023-07-23 DIAGNOSIS — Z99.89 OSA ON CPAP: Primary | ICD-10-CM

## 2023-10-12 ENCOUNTER — OFFICE VISIT (OUTPATIENT)
Dept: FAMILY MEDICINE CLINIC | Facility: CLINIC | Age: 53
End: 2023-10-12
Payer: OTHER GOVERNMENT

## 2023-10-12 VITALS
RESPIRATION RATE: 19 BRPM | OXYGEN SATURATION: 93 % | BODY MASS INDEX: 31.18 KG/M2 | HEIGHT: 64 IN | HEART RATE: 94 BPM | TEMPERATURE: 97.5 F | DIASTOLIC BLOOD PRESSURE: 81 MMHG | WEIGHT: 182.6 LBS | SYSTOLIC BLOOD PRESSURE: 113 MMHG

## 2023-10-12 DIAGNOSIS — J20.9 ACUTE BRONCHITIS, UNSPECIFIED ORGANISM: Primary | ICD-10-CM

## 2023-10-12 DIAGNOSIS — R55 PRE-SYNCOPE: ICD-10-CM

## 2023-10-12 PROCEDURE — 99213 OFFICE O/P EST LOW 20 MIN: CPT | Performed by: INTERNAL MEDICINE

## 2023-10-12 PROCEDURE — 3074F SYST BP LT 130 MM HG: CPT | Performed by: INTERNAL MEDICINE

## 2023-10-12 PROCEDURE — 3079F DIAST BP 80-89 MM HG: CPT | Performed by: INTERNAL MEDICINE

## 2023-10-12 RX ORDER — AZITHROMYCIN 250 MG/1
250 TABLET, FILM COATED ORAL SEE ADMIN INSTRUCTIONS
Qty: 6 TABLET | Refills: 0 | Status: SHIPPED | OUTPATIENT
Start: 2023-10-12 | End: 2023-10-17

## 2023-10-12 RX ORDER — BENZONATATE 100 MG/1
100 CAPSULE ORAL 3 TIMES DAILY PRN
COMMUNITY

## 2023-10-12 ASSESSMENT — ENCOUNTER SYMPTOMS
SHORTNESS OF BREATH: 0
SORE THROAT: 0
WHEEZING: 0
SINUS PAIN: 0

## 2023-10-12 NOTE — PROGRESS NOTES
HISTORY OF PRESENT ILLNESS  En Salgado is a 45 y.o. female    HPI  C/o cough, started 3 weeks ago, coughing slight yellow sputum, not any better, she took sudafed yesterday and felt lightheaded/palpitations  and about to pass out, she laid down on the floor and felt better, she went to the ER and had normal CBC/BMP/EKG, given tessalon for cough which helped some. Review of Systems   Constitutional:  Negative for fever. HENT:  Negative for ear pain, sinus pain and sore throat. Respiratory:  Negative for shortness of breath and wheezing. Cardiovascular:  Negative for chest pain. Neurological:  Negative for syncope. Physical Exam  Vitals reviewed. HENT:      Right Ear: Tympanic membrane normal.      Left Ear: Tympanic membrane normal.      Mouth/Throat:      Pharynx: No posterior oropharyngeal erythema. Cardiovascular:      Rate and Rhythm: Normal rate and regular rhythm. Heart sounds: No murmur heard. Pulmonary:      Effort: Pulmonary effort is normal.      Breath sounds: Normal breath sounds. No wheezing or rales. Lymphadenopathy:      Cervical: No cervical adenopathy. Neurological:      Mental Status: She is alert. ASSESSMENT and PLAN    1. Acute bronchitis, unspecified organism  -     azithromycin (ZITHROMAX) 250 MG tablet; Take 1 tablet by mouth See Admin Instructions for 5 days 500mg on day 1 followed by 250mg on days 2 - 5, Disp-6 tablet, R-0Normal  - continue tessalon 100 mg po TID as needed for cough  2. Pre-syncope, resolved, advised pt to avoid sudafed in the future. Return if symptoms worsen or fail to improve.

## 2023-10-16 RX ORDER — BENZONATATE 200 MG/1
200 CAPSULE ORAL 3 TIMES DAILY PRN
Qty: 30 CAPSULE | Refills: 0 | Status: SHIPPED | OUTPATIENT
Start: 2023-10-16

## 2023-11-13 ENCOUNTER — OFFICE VISIT (OUTPATIENT)
Dept: FAMILY MEDICINE CLINIC | Facility: CLINIC | Age: 53
End: 2023-11-13
Payer: OTHER GOVERNMENT

## 2023-11-13 VITALS
WEIGHT: 186 LBS | BODY MASS INDEX: 31.76 KG/M2 | RESPIRATION RATE: 14 BRPM | HEIGHT: 64 IN | OXYGEN SATURATION: 99 % | SYSTOLIC BLOOD PRESSURE: 120 MMHG | HEART RATE: 80 BPM | TEMPERATURE: 97.7 F | DIASTOLIC BLOOD PRESSURE: 78 MMHG

## 2023-11-13 DIAGNOSIS — R00.2 PALPITATIONS: ICD-10-CM

## 2023-11-13 DIAGNOSIS — R91.8 PULMONARY NODULES: ICD-10-CM

## 2023-11-13 DIAGNOSIS — R93.89 ABNORMAL CT OF THE CHEST: Primary | ICD-10-CM

## 2023-11-13 PROCEDURE — 99214 OFFICE O/P EST MOD 30 MIN: CPT | Performed by: INTERNAL MEDICINE

## 2023-11-13 PROCEDURE — 3074F SYST BP LT 130 MM HG: CPT | Performed by: INTERNAL MEDICINE

## 2023-11-13 PROCEDURE — 3078F DIAST BP <80 MM HG: CPT | Performed by: INTERNAL MEDICINE

## 2023-11-13 ASSESSMENT — ENCOUNTER SYMPTOMS
WHEEZING: 0
SHORTNESS OF BREATH: 0
ABDOMINAL PAIN: 0
COUGH: 0

## 2023-11-13 NOTE — PROGRESS NOTES
HISTORY OF PRESENT ILLNESS  Jody Diallo is a 45 y.o. female    HPI  C/o went to the ER 6 days ago for left leg/calf pain/palpitations, the ER records noted today, she had labs/EKG and ct scan of the chest done to r/o PE, her labs were ok,  her EKG was ok, her CT showed RLL ground glass opacity, ? Infection vs atelectasis, she has no fever, no cough or wheezing, radiology recommend repeating ct in 8-12 weeks, ct also showed pulmonary nodules. She has palpitations about twice weekly, each last 20 minutes or so. Her labs and ct scan results reviewed today, labs: On 11-7-23: WBC 7.74, Hgb 12.7, Platelets 769, her PT was 12.5, INR 0.91, PTT 22.5, D-Dimer was 0.68, , K 3.7, creat 0.70, BUN 12.1, , glucose 94, AST 18, ALT 13  Ct result shown below,  Her left leg pain has completely resolved now. Review of Systems   Constitutional:  Negative for chills and fever. Respiratory:  Negative for cough, shortness of breath and wheezing. Cardiovascular:  Negative for chest pain. Gastrointestinal:  Negative for abdominal pain. Physical Exam  Vitals reviewed. Cardiovascular:      Rate and Rhythm: Normal rate and regular rhythm. Heart sounds: No murmur heard. Pulmonary:      Effort: Pulmonary effort is normal.      Breath sounds: Normal breath sounds. No wheezing or rales. ASSESSMENT and PLAN    1. Abnormal CT of the chest, will repat ct in 8 weeks to document resolution, pt has no symptoms of Pneumonia. -     CT CHEST W CONTRAST; Future  2. Pulmonary nodules  -     CT CHEST W CONTRAST; Future  3. Palpitations, will check:  -     Cardiac event monitor; Future       Her CT angiogram results on 11-7-2023:        No follow-ups on file.

## 2024-01-08 ENCOUNTER — OFFICE VISIT (OUTPATIENT)
Dept: FAMILY MEDICINE CLINIC | Facility: CLINIC | Age: 54
End: 2024-01-08
Payer: OTHER GOVERNMENT

## 2024-01-08 VITALS
RESPIRATION RATE: 18 BRPM | DIASTOLIC BLOOD PRESSURE: 86 MMHG | BODY MASS INDEX: 30.93 KG/M2 | OXYGEN SATURATION: 96 % | TEMPERATURE: 98.1 F | WEIGHT: 181.2 LBS | SYSTOLIC BLOOD PRESSURE: 125 MMHG | HEIGHT: 64 IN | HEART RATE: 82 BPM

## 2024-01-08 DIAGNOSIS — J20.9 ACUTE BRONCHITIS, UNSPECIFIED ORGANISM: Primary | ICD-10-CM

## 2024-01-08 PROCEDURE — 3079F DIAST BP 80-89 MM HG: CPT | Performed by: INTERNAL MEDICINE

## 2024-01-08 PROCEDURE — 3074F SYST BP LT 130 MM HG: CPT | Performed by: INTERNAL MEDICINE

## 2024-01-08 PROCEDURE — 99213 OFFICE O/P EST LOW 20 MIN: CPT | Performed by: INTERNAL MEDICINE

## 2024-01-08 RX ORDER — BENZONATATE 200 MG/1
200 CAPSULE ORAL 3 TIMES DAILY PRN
Qty: 20 CAPSULE | Refills: 0 | Status: SHIPPED | OUTPATIENT
Start: 2024-01-08 | End: 2024-01-15

## 2024-01-08 RX ORDER — AZITHROMYCIN 250 MG/1
250 TABLET, FILM COATED ORAL SEE ADMIN INSTRUCTIONS
Qty: 6 TABLET | Refills: 0 | Status: SHIPPED | OUTPATIENT
Start: 2024-01-08 | End: 2024-01-13

## 2024-01-08 ASSESSMENT — ENCOUNTER SYMPTOMS
SHORTNESS OF BREATH: 0
SORE THROAT: 0
WHEEZING: 0
SINUS PAIN: 0

## 2024-01-08 NOTE — PROGRESS NOTES
Afia Mitchell is a 53 y.o. female (: 1970) presenting to address:    Chief Complaint   Patient presents with    Cough       Vitals:    24 1405   BP: 125/86   Pulse: 82   Resp: 18   Temp: 98.1 °F (36.7 °C)   SpO2: 96%       Coordination of Care Questionaire:   1. \"Have you been to the ER, urgent care clinic since your last visit?  Hospitalized since your last visit?\" No    2. \"Have you seen or consulted any other health care providers outside of the [facility] since your last visit?\" No     3. For patients aged 45-75: Has the patient had a colonoscopy? Yes - no Care Gap present    If the patient is female:    4. For patients aged 40-74: Has the patient had a mammogram within the past 2 years? Yes - no Care Gap present    5. For patients aged 21-65: Has the patient had a pap smear? Yes - no Care Gap present    Advanced Directive:   1. Do you have an Advanced Directive? No    2. Would you like information on Advanced Directives? No

## 2024-01-08 NOTE — PROGRESS NOTES
HISTORY OF PRESENT ILLNESS  Afia Mitchell is a 53 y.o. female    HPI  C/o cough, started 2 weeks ago, coughing slight yellow sputum, took home covid test that was negative  No wheezing or sob  Not getting any better    Review of Systems   Constitutional:  Negative for fever.   HENT:  Negative for ear pain, sinus pain and sore throat.    Respiratory:  Negative for shortness of breath and wheezing.    Cardiovascular:  Negative for chest pain.         Physical Exam  Vitals reviewed.   HENT:      Right Ear: Tympanic membrane normal.      Left Ear: Tympanic membrane normal.      Mouth/Throat:      Pharynx: No oropharyngeal exudate or posterior oropharyngeal erythema.   Cardiovascular:      Rate and Rhythm: Normal rate and regular rhythm.   Pulmonary:      Effort: Pulmonary effort is normal.      Breath sounds: Normal breath sounds. No wheezing, rhonchi or rales.   Musculoskeletal:      Cervical back: Neck supple.   Lymphadenopathy:      Cervical: No cervical adenopathy.          ASSESSMENT and PLAN    1. Acute bronchitis, unspecified organism  -     benzonatate (TESSALON) 200 MG capsule; Take 1 capsule by mouth 3 times daily as needed for Cough, Disp-20 capsule, R-0Normal  -     azithromycin (ZITHROMAX) 250 MG tablet; Take 1 tablet by mouth See Admin Instructions for 5 days 500mg on day 1 followed by 250mg on days 2 - 5, Disp-6 tablet, R-0Normal         Return if symptoms worsen or fail to improve.

## 2024-02-12 ENCOUNTER — OFFICE VISIT (OUTPATIENT)
Dept: FAMILY MEDICINE CLINIC | Facility: CLINIC | Age: 54
End: 2024-02-12
Payer: OTHER GOVERNMENT

## 2024-02-12 VITALS
OXYGEN SATURATION: 98 % | DIASTOLIC BLOOD PRESSURE: 69 MMHG | WEIGHT: 181 LBS | HEIGHT: 64 IN | TEMPERATURE: 98.2 F | HEART RATE: 90 BPM | BODY MASS INDEX: 30.9 KG/M2 | RESPIRATION RATE: 18 BRPM | SYSTOLIC BLOOD PRESSURE: 106 MMHG

## 2024-02-12 DIAGNOSIS — G57.01 PIRIFORMIS SYNDROME OF RIGHT SIDE: Primary | ICD-10-CM

## 2024-02-12 DIAGNOSIS — D68.51 FACTOR V LEIDEN MUTATION (HCC): ICD-10-CM

## 2024-02-12 PROCEDURE — 99213 OFFICE O/P EST LOW 20 MIN: CPT | Performed by: INTERNAL MEDICINE

## 2024-02-12 PROCEDURE — 3074F SYST BP LT 130 MM HG: CPT | Performed by: INTERNAL MEDICINE

## 2024-02-12 PROCEDURE — 3078F DIAST BP <80 MM HG: CPT | Performed by: INTERNAL MEDICINE

## 2024-02-12 RX ORDER — CYCLOBENZAPRINE HCL 10 MG
10 TABLET ORAL 3 TIMES DAILY PRN
Qty: 30 TABLET | Refills: 0 | Status: SHIPPED | OUTPATIENT
Start: 2024-02-12

## 2024-02-12 RX ORDER — GABAPENTIN 300 MG/1
300 CAPSULE ORAL 3 TIMES DAILY PRN
Qty: 20 CAPSULE | Refills: 0 | Status: SHIPPED | OUTPATIENT
Start: 2024-02-12 | End: 2024-02-19

## 2024-02-12 RX ORDER — PREDNISONE 10 MG/1
TABLET ORAL
Qty: 1 EACH | Refills: 0 | Status: SHIPPED | OUTPATIENT
Start: 2024-02-12

## 2024-02-12 NOTE — PROGRESS NOTES
HISTORY OF PRESENT ILLNESS  Afia Mitchell is a 53 y.o. female    HPI    C/o right side pain in the buttock area, radiates to the back of the leg at times, started last week, much worse when she sit for long time, better with standing, no rash, no numbness or weakness.  Review of Systems   Constitutional:  Negative for fever.   Skin:  Negative for rash.   Neurological:  Negative for weakness.         Physical Exam  Vitals reviewed.   Musculoskeletal:      Right hip: No tenderness, bony tenderness or crepitus. Normal range of motion.      Right upper leg: Tenderness (mid gluteal area.) present.          ASSESSMENT and PLAN    1. Piriformis syndrome of right side  -     predniSONE 10 MG (21) TBPK; As per dose pack instructions, Disp-1 each, R-0Normal  -     cyclobenzaprine (FLEXERIL) 10 MG tablet; Take 1 tablet by mouth 3 times daily as needed for Muscle spasms, Disp-30 tablet, R-0Normal  -     [facility]  -     gabapentin (NEURONTIN) 300 MG capsule; Take 1 capsule by mouth 3 times daily as needed (for pain) for up to 7 days. Max Daily Amount: 900 mg, Disp-20 capsule, R-0Normal  2. Factor V Leiden mutation (HCC), followed by Hem/onc, need new referral to follow up with Dr Blanca.  -     External Referral To Hematology Oncology         Return if symptoms worsen or fail to improve.

## 2024-02-12 NOTE — PROGRESS NOTES
Afia Mitchell is a 53 y.o. female (: 1970) presenting to address:    Chief Complaint   Patient presents with    Back Pain       Vitals:    24 1425   BP: 106/69   Pulse: 90   Resp: 18   Temp: 98.2 °F (36.8 °C)   SpO2: 98%       \"Have you been to the ER, urgent care clinic since your last visit?  Hospitalized since your last visit?\"    NO    “Have you seen or consulted any other health care providers outside of [facility] since your last visit?”    NO

## 2024-02-23 ENCOUNTER — HOSPITAL ENCOUNTER (OUTPATIENT)
Facility: HOSPITAL | Age: 54
Setting detail: RECURRING SERIES
Discharge: HOME | End: 2024-02-26
Payer: OTHER GOVERNMENT

## 2024-02-23 PROCEDURE — 97535 SELF CARE MNGMENT TRAINING: CPT

## 2024-02-23 PROCEDURE — 97110 THERAPEUTIC EXERCISES: CPT

## 2024-02-23 PROCEDURE — 97161 PT EVAL LOW COMPLEX 20 MIN: CPT

## 2024-02-23 NOTE — THERAPY EVALUATION
JENN COBOS [facility] - [facility]  [address], [address], [place] [address] Ph:[phone] Fx: [phone]  Plan of Care / Statement of Necessity for Physical Therapy Services     Patient Name: Afia Mitchell : 1970   Medical   Diagnosis:  Pain in right leg [M79.604] Treatment Diagnosis: M79.604  Pain in right leg    Onset Date: 2024     Referral Source: Nas Martin MD Start of Care (SOC): 2024   Prior Hospitalization: See medical history Provider #: [id number]   Prior Level of Function: IND all ADLs   Comorbidities: SVT, arthritis, back pain, sleep dysfunction, prior surgery     Assessment / key information:  Afia Mitchell is a 53 y.o. female who presents to skilled PT for the treatment diagnosis of R buttock pain. Right now the pain is aching consistently. Pain has been progressively getting worse since Jared. Denies any RICHY for this. Pt tried tramadol to help with the pain. Was given a steroid taper and that helped while on it, but once off it the pain is back to the same. Tried gabapentin and that impaired her ability to function. Tried it again and it took 16 hours for her to be able to function. No longer taking it.   Pt notes that shifting her weight onto her R buttock can incr her pain. Pain is also worse with going from sitting to standing, not being able to put immediate weight on the R leg. The pain is described as sharp, but sometimes when standing it can be not sharp. Notes some difficulty with being able to get erect after sitting for too long because of the pain. Once she is erect or after a couple steps, the sharp pain is gone and mostly just dull. Pt can walk without the sharp limiting pain, but still feels the dull ache. Denies any pain down the R leg. Denies numbness/tingling. Denies sig back pain.   Does have a history lower back pain and was treated in PT for that. Had a fall tripping over a curb about 1 year ago.

## 2024-02-23 NOTE — PROGRESS NOTES
PHYSICAL / OCCUPATIONAL THERAPY - DAILY TREATMENT NOTE (updated )  For Eval visit    Patient Name: Afia Mitchell    Date: 2024    : 1970  Insurance: Payor:  EAST / Plan:  EAST / Product Type: *No Product type* /      Patient  verified yes     Visit #   Current / Total 1 12-16   Time   In / Out 8:20 9:09   Pain   In / Out 7/10 7/10   Subjective Functional Status/Changes: See POC     TREATMENT AREA =  Pain in right leg [M79.604]    OBJECTIVE           31 min   Eval - untimed                      Therapeutic Procedures:  Tx Min Billable or 1:1 Min (if diff from Tx Min) Procedure, Rationale, Specifics   10  81360 Self Care/Home Management (timed):  improve patient knowledge and understanding of pain reducing techniques, positioning, posture/ergonomics, home safety, activity modification, diagnosis/prognosis, and physical therapy expectations, procedures and progression  to improve patient's ability to progress to PLOF and address remaining functional goals.  (see flow sheet as applicable)     Details if applicable:    Reviewed diagnosis, prognosis, therapy progression   Reviewed and educated on HEP    8    51961 Therapeutic Exercise (timed):  increase ROM, strength, coordination, balance, and proprioception to improve patient's ability to progress to PLOF and address remaining functional goals. (see flow sheet as applicable)       Details if applicable:    Review of HEP form     []   assessing strength/ROM  []   assessing activity performance (ex: sit to stand, stair negotiation)  []   assessing balance/control (ex. Dillard, DGI, SLS)          Details if applicable:      []   assessing strength/ROM  []   assessing activity performance (ex: sit to stand, stair negotiation)  []   assessing balance/control (ex. Dillard, DGI, SLS)          Details if applicable:      []   assessing strength/ROM  []   assessing activity performance (ex: sit to stand, stair negotiation)  []   assessing

## 2024-02-27 ENCOUNTER — HOSPITAL ENCOUNTER (OUTPATIENT)
Facility: HOSPITAL | Age: 54
Setting detail: RECURRING SERIES
Discharge: HOME | End: 2024-03-01
Payer: OTHER GOVERNMENT

## 2024-02-27 PROCEDURE — 97530 THERAPEUTIC ACTIVITIES: CPT

## 2024-02-27 PROCEDURE — 97112 NEUROMUSCULAR REEDUCATION: CPT

## 2024-02-27 PROCEDURE — 97110 THERAPEUTIC EXERCISES: CPT

## 2024-02-27 PROCEDURE — 97140 MANUAL THERAPY 1/> REGIONS: CPT

## 2024-02-27 NOTE — PROGRESS NOTES
estim unattended, both untimed codes, in totals to left)  8-22 min = 1 unit; 23-37 min = 2 units; 38-52 min = 3 units; 53-67 min = 4 units; 68-82 min = 5 units   Total Total     [x]  Patient Education billed concurrently with other procedures   [x] Review HEP    [] Progressed/Changed HEP, detail:    [] Other detail:       Objective Information/Functional Measures/Assessment    Repeated motions testing (-) all planes  Prone ext incr pain, worse with resistance   MMT HS - no reproduction of pain  TTP along piriformis with sharp pain    LS cleared today with repeated motions testing. Most pain she feels is when putting weight on it and also with active hip extension. Based on testing, possible gluteal strain/piriformis syndrome without neurogenic component. Focused on STM/TPR of the gluts staying away form sharp localized pain and gentle PROM and stretching. Pt felt good relief of symptoms with the STM/TPR. Also reported feeling really good after the GS. Ended with MHP. Denied sig sharp pain post session,     Patient will continue to benefit from skilled PT / OT services to modify and progress therapeutic interventions, analyze and address functional mobility deficits, analyze and address ROM deficits, analyze and address strength deficits, analyze and address soft tissue restrictions, analyze and cue for proper movement patterns, analyze and modify for postural abnormalities, and instruct in home and community integration to address functional deficits and attain remaining goals.    Progress toward goals / Updated goals:  []  See Progress Note/Recertification    Short Term Goals: To be accomplished in 4 weeks  1) Pt will be IND with HEP to facilitate self care management.   EVAL: Provided HEP  Current Status: - been working on IND, 02/27/24  Goal Met?  -      2) Pt will be able to immediately WB on R LE without sharp pain and dull pain of 5/10 or less.   EVAL:   Current Status: - post session, able to place weight on

## 2024-03-01 ENCOUNTER — HOSPITAL ENCOUNTER (OUTPATIENT)
Facility: HOSPITAL | Age: 54
Setting detail: RECURRING SERIES
Discharge: HOME | End: 2024-03-04
Payer: OTHER GOVERNMENT

## 2024-03-01 PROCEDURE — 97112 NEUROMUSCULAR REEDUCATION: CPT

## 2024-03-01 PROCEDURE — 97140 MANUAL THERAPY 1/> REGIONS: CPT

## 2024-03-01 PROCEDURE — 97110 THERAPEUTIC EXERCISES: CPT

## 2024-03-01 NOTE — PROGRESS NOTES
PHYSICAL / OCCUPATIONAL THERAPY - DAILY TREATMENT NOTE (updated )    Patient Name: Afia Mitchell    Date: 3/1/2024    : 1970  Insurance: Payor: [employer] EAST / Plan: [employer] EAST / Product Type: *No Product type* /      Patient  verified Yes     Visit #   Current / Total 3 12-16   Time   In / Out 7:40 8:30   Pain   In / Out 4/10 2/10   Subjective Functional Status/Changes: No issues getting up and getting down after last session. But came back later that night.  The butt clenches seem to help the most      TREATMENT AREA =  Pain in right leg [M79.604]    OBJECTIVE    Modalities Rationale:     decrease pain and increase tissue extensibility to improve patient's ability to progress to PLOF and address remaining functional goals.     min [] Estim Unattended, type/location:                                      []  w/ice    []  w/heat    min [] Estim Attended, type/location:                                     []  w/US     []  w/ice    []  w/heat    []  TENS insruct      min []  Mechanical Traction: type/lbs                   []  pro   []  sup   []  int   []  cont    []  before manual    []  after manual    min []  Ultrasound, settings/location:     10 min  unbill []  Ice     [x]  Heat    location/position: L S/L R hip with 10 lb on top for OP    min []  Paraffin,  details:     min []  Vasopneumatic Device, press/temp:     min []  Whirlpool / Fluido:    If using vaso (only need to measure limb vaso being performed on)      pre-treatment girth :       post-treatment girth :       measured at (landmark location) :      min []  Other:    Skin assessment post-treatment:   Intact      Therapeutic Procedures:  Tx Min Billable or 1:1 Min (if diff from Tx Min) Procedure, Rationale, Specifics   18  [id number] Therapeutic Exercise (timed):  increase ROM, strength, coordination, balance, and proprioception to improve patient's ability to progress to PLOF and address remaining functional goals. (see flow sheet as

## 2024-03-05 ENCOUNTER — HOSPITAL ENCOUNTER (OUTPATIENT)
Facility: HOSPITAL | Age: 54
Setting detail: RECURRING SERIES
Discharge: HOME | End: 2024-03-08
Payer: OTHER GOVERNMENT

## 2024-03-05 PROCEDURE — 97110 THERAPEUTIC EXERCISES: CPT

## 2024-03-05 PROCEDURE — 97140 MANUAL THERAPY 1/> REGIONS: CPT

## 2024-03-05 NOTE — PROGRESS NOTES
PHYSICAL / OCCUPATIONAL THERAPY - DAILY TREATMENT NOTE (updated )    Patient Name: Afia Mitchell    Date: 3/5/2024    : 1970  Insurance: Payor: [employer] EAST / Plan: [employer] EAST / Product Type: *No Product type* /      Patient  verified Yes     Visit #   Current / Total 4 12-16   Time   In / Out 7:40    Pain   In / Out 6/10     Subjective Functional Status/Changes: Was fine on Friday.and that was fine. Did not take flexeril on Friday. Woke up on Saturday and the pain was bad. Was bad through Saturday and .        TREATMENT AREA =  Pain in right leg [M79.604]    OBJECTIVE    Modalities Rationale:     decrease pain and increase tissue extensibility to improve patient's ability to progress to PLOF and address remaining functional goals.     min [] Estim Unattended, type/location:                                      []  w/ice    []  w/heat    min [] Estim Attended, type/location:                                     []  w/US     []  w/ice    []  w/heat    []  TENS insruct      min []  Mechanical Traction: type/lbs                   []  pro   []  sup   []  int   []  cont    []  before manual    []  after manual    min []  Ultrasound, settings/location:     10 min  unbill [x]  Ice     []  Heat    location/position: L S/L R hip with 10 lb on top for OP    min []  Paraffin,  details:     min []  Vasopneumatic Device, press/temp:     min []  Whirlpool / Fluido:    If using vaso (only need to measure limb vaso being performed on)      pre-treatment girth :       post-treatment girth :       measured at (landmark location) :      min []  Other:    Skin assessment post-treatment:   Intact      Therapeutic Procedures:  Tx Min Billable or 1:1 Min (if diff from Tx Min) Procedure, Rationale, Specifics   10  34896 Therapeutic Exercise (timed):  increase ROM, strength, coordination, balance, and proprioception to improve patient's ability to progress to PLOF and address remaining functional goals. (see flow

## 2024-03-08 ENCOUNTER — HOSPITAL ENCOUNTER (OUTPATIENT)
Facility: HOSPITAL | Age: 54
Setting detail: RECURRING SERIES
Discharge: HOME | End: 2024-03-11
Payer: OTHER GOVERNMENT

## 2024-03-08 PROCEDURE — 97530 THERAPEUTIC ACTIVITIES: CPT

## 2024-03-08 PROCEDURE — 97110 THERAPEUTIC EXERCISES: CPT

## 2024-03-08 PROCEDURE — 97140 MANUAL THERAPY 1/> REGIONS: CPT

## 2024-03-08 NOTE — PROGRESS NOTES
PHYSICAL / OCCUPATIONAL THERAPY - DAILY TREATMENT NOTE (updated )    Patient Name: Afia Mitchell    Date: 3/8/2024    : 1970  Insurance: Payor:  EAST / Plan: [employer] EAST / Product Type: *No Product type* /      Patient  verified Yes     Visit #   Current / Total 5 12-16   Time   In / Out 7:00 7:50   Pain   In / Out 4/10  4/10   Subjective Functional Status/Changes: Pt continues to have difficulty with sit to stand transitions.       TREATMENT AREA =  Pain in right leg [M79.604]    OBJECTIVE    Modalities Rationale:     decrease pain and increase tissue extensibility to improve patient's ability to progress to PLOF and address remaining functional goals.     min [] Estim Unattended, type/location:                                      []  w/ice    []  w/heat    min [] Estim Attended, type/location:                                     []  w/US     []  w/ice    []  w/heat    []  TENS insruct      min []  Mechanical Traction: type/lbs                   []  pro   []  sup   []  int   []  cont    []  before manual    []  after manual    min []  Ultrasound, settings/location:      min  unbill [x]  Ice     []  Heat    location/position: L S/L R hip with 10 lb on top for OP    min []  Paraffin,  details:     min []  Vasopneumatic Device, press/temp:     min []  Whirlpool / Fluido:    If using vaso (only need to measure limb vaso being performed on)      pre-treatment girth :       post-treatment girth :       measured at (landmark location) :      min []  Other:    Skin assessment post-treatment:   Intact      Therapeutic Procedures:  Tx Min Billable or 1:1 Min (if diff from Tx Min) Procedure, Rationale, Specifics   20  74594 Therapeutic Exercise (timed):  increase ROM, strength, coordination, balance, and proprioception to improve patient's ability to progress to PLOF and address remaining functional goals. (see flow sheet as applicable)     Details if applicable:        []   assessing

## 2024-03-12 ENCOUNTER — HOSPITAL ENCOUNTER (OUTPATIENT)
Facility: HOSPITAL | Age: 54
Setting detail: RECURRING SERIES
Discharge: HOME | End: 2024-03-15
Payer: OTHER GOVERNMENT

## 2024-03-12 DIAGNOSIS — G57.01 PIRIFORMIS SYNDROME OF RIGHT SIDE: ICD-10-CM

## 2024-03-12 PROCEDURE — 97530 THERAPEUTIC ACTIVITIES: CPT

## 2024-03-12 PROCEDURE — 97112 NEUROMUSCULAR REEDUCATION: CPT

## 2024-03-12 PROCEDURE — 97110 THERAPEUTIC EXERCISES: CPT

## 2024-03-12 PROCEDURE — 97140 MANUAL THERAPY 1/> REGIONS: CPT

## 2024-03-12 RX ORDER — CYCLOBENZAPRINE HCL 10 MG
10 TABLET ORAL 3 TIMES DAILY PRN
Qty: 30 TABLET | Refills: 0 | Status: SHIPPED | OUTPATIENT
Start: 2024-03-12

## 2024-03-12 NOTE — PROGRESS NOTES
PHYSICAL / OCCUPATIONAL THERAPY - DAILY TREATMENT NOTE (updated )    Patient Name: Afia Mitchell    Date: 3/12/2024    : 1970  Insurance: Payor: [employer] EAST / Plan: [employer] EAST / Product Type: *No Product type* /      Patient  verified Yes     Visit #   Current / Total 6 12-16   Time   In / Out 7:40 8:30   Pain   In / Out 8/10  4/10   Subjective Functional Status/Changes: She was doing fine over the weekend. But this morning she is feeling like an 8/10. When coming up the steps, she felt like she had pain inside her bone. The repea     TREATMENT AREA =  Pain in right leg [M79.604]    OBJECTIVE    Modalities Rationale:     decrease pain and increase tissue extensibility to improve patient's ability to progress to PLOF and address remaining functional goals.     min [] Estim Unattended, type/location:                                      []  w/ice    []  w/heat    min [] Estim Attended, type/location:                                     []  w/US     []  w/ice    []  w/heat    []  TENS insruct      min []  Mechanical Traction: type/lbs                   []  pro   []  sup   []  int   []  cont    []  before manual    []  after manual    min []  Ultrasound, settings/location:     10 min  unbill [x]  Ice     []  Heat    location/position: Supine LS     min []  Paraffin,  details:     min []  Vasopneumatic Device, press/temp:     min []  Whirlpool / Fluido:    If using vaso (only need to measure limb vaso being performed on)      pre-treatment girth :       post-treatment girth :       measured at (landmark location) :      min []  Other:    Skin assessment post-treatment:   Intact      Therapeutic Procedures:  Tx Min Billable or 1:1 Min (if diff from Tx Min) Procedure, Rationale, Specifics   10  30995 Therapeutic Exercise (timed):  increase ROM, strength, coordination, balance, and proprioception to improve patient's ability to progress to PLOF and address remaining functional goals. (see flow sheet

## 2024-03-15 ENCOUNTER — HOSPITAL ENCOUNTER (OUTPATIENT)
Facility: HOSPITAL | Age: 54
Setting detail: RECURRING SERIES
Discharge: HOME | End: 2024-03-18
Payer: OTHER GOVERNMENT

## 2024-03-15 PROCEDURE — 97530 THERAPEUTIC ACTIVITIES: CPT

## 2024-03-15 PROCEDURE — 97112 NEUROMUSCULAR REEDUCATION: CPT

## 2024-03-15 PROCEDURE — 97110 THERAPEUTIC EXERCISES: CPT

## 2024-03-15 NOTE — PROGRESS NOTES
PHYSICAL / OCCUPATIONAL THERAPY - DAILY TREATMENT NOTE (updated )    Patient Name: Afia Mitchell    Date: 3/15/2024    : 1970  Insurance: Payor:  EAST / Plan:  EAST / Product Type: *No Product type* /      Patient  verified Yes     Visit #   Current / Total 7 12-16   Time   In / Out 7:40 8:20   Pain   In / Out 2/10  0/10   Subjective Functional Status/Changes: Pt reports waking up in increased pain but is much better now.     TREATMENT AREA =  Pain in right leg [M79.604]    OBJECTIVE    Modalities Rationale:     decrease pain and increase tissue extensibility to improve patient's ability to progress to PLOF and address remaining functional goals.     min [] Estim Unattended, type/location:                                      []  w/ice    []  w/heat    min [] Estim Attended, type/location:                                     []  w/US     []  w/ice    []  w/heat    []  TENS insruct      min []  Mechanical Traction: type/lbs                   []  pro   []  sup   []  int   []  cont    []  before manual    []  after manual    min []  Ultrasound, settings/location:      min  unbill [x]  Ice     []  Heat    location/position: Supine LS     min []  Paraffin,  details:     min []  Vasopneumatic Device, press/temp:     min []  Whirlpool / Fluido:    If using vaso (only need to measure limb vaso being performed on)      pre-treatment girth :       post-treatment girth :       measured at (landmark location) :      min []  Other:    Skin assessment post-treatment:   Intact      Therapeutic Procedures:  Tx Min Billable or 1:1 Min (if diff from Tx Min) Procedure, Rationale, Specifics   10  19555 Therapeutic Exercise (timed):  increase ROM, strength, coordination, balance, and proprioception to improve patient's ability to progress to PLOF and address remaining functional goals. (see flow sheet as applicable)     Details if applicable:        []   assessing strength/ROM  []   assessing activity

## 2024-03-19 ENCOUNTER — HOSPITAL ENCOUNTER (OUTPATIENT)
Facility: HOSPITAL | Age: 54
Setting detail: RECURRING SERIES
Discharge: HOME | End: 2024-03-22
Payer: OTHER GOVERNMENT

## 2024-03-19 PROCEDURE — 97140 MANUAL THERAPY 1/> REGIONS: CPT

## 2024-03-19 PROCEDURE — 97110 THERAPEUTIC EXERCISES: CPT

## 2024-03-19 PROCEDURE — 97112 NEUROMUSCULAR REEDUCATION: CPT

## 2024-03-19 NOTE — PROGRESS NOTES
PHYSICAL / OCCUPATIONAL THERAPY - DAILY TREATMENT NOTE (updated )    Patient Name: Afia Mitchell    Date: 3/19/2024    : 1970  Insurance: Payor: [employer] EAST / Plan: [employer] EAST / Product Type: *No Product type* /      Patient  verified Yes     Visit #   Current / Total 8 12-16   Time   In / Out 7:40    Pain   In / Out 4/10 3/10   Subjective Functional Status/Changes: It is achy today but yesterday it was not too bad. Having less bad days.   Has been working on the bird dogs and back extensions at her desk.      TREATMENT AREA =  Pain in right leg [M79.604]    OBJECTIVE    Modalities Rationale:     decrease pain and increase tissue extensibility to improve patient's ability to progress to PLOF and address remaining functional goals.     min [] Estim Unattended, type/location:                                      []  w/ice    []  w/heat    min [] Estim Attended, type/location:                                     []  w/US     []  w/ice    []  w/heat    []  TENS insruct      min []  Mechanical Traction: type/lbs                   []  pro   []  sup   []  int   []  cont    []  before manual    []  after manual    min []  Ultrasound, settings/location:     10 min  unbill []  Ice     [x]  Heat    location/position: L S/L R hip    min []  Paraffin,  details:     min []  Vasopneumatic Device, press/temp:     min []  Whirlpool / Fluido:    If using vaso (only need to measure limb vaso being performed on)      pre-treatment girth :       post-treatment girth :       measured at (landmark location) :      min []  Other:    Skin assessment post-treatment:   Intact      Therapeutic Procedures:  Tx Min Billable or 1:1 Min (if diff from Tx Min) Procedure, Rationale, Specifics   15  87853 Therapeutic Exercise (timed):  increase ROM, strength, coordination, balance, and proprioception to improve patient's ability to progress to PLOF and address remaining functional goals. (see flow sheet as applicable)

## 2024-03-21 NOTE — PROGRESS NOTES
PHYSICAL / OCCUPATIONAL THERAPY - DAILY TREATMENT NOTE (updated )    Patient Name: Afia Mitchell    Date: 3/21/2024    : 1970  Insurance: Payor: [employer] EAST / Plan: [employer] EAST / Product Type: *No Product type* /      Patient  verified Yes     Visit #   Current / Total 9 12-16   Time   In / Out 7:40 8:20   Pain   In / Out 7/10 2/10   Subjective Functional Status/Changes: SEE PN     TREATMENT AREA =  Pain in right leg [M79.604]    OBJECTIVE    Modalities Rationale:     decrease pain and increase tissue extensibility to improve patient's ability to progress to PLOF and address remaining functional goals.     min [] Estim Unattended, type/location:                                      []  w/ice    []  w/heat    min [] Estim Attended, type/location:                                     []  w/US     []  w/ice    []  w/heat    []  TENS insruct      min []  Mechanical Traction: type/lbs                   []  pro   []  sup   []  int   []  cont    []  before manual    []  after manual    min []  Ultrasound, settings/location:      min  unbill []  Ice     [x]  Heat    location/position: L S/L R hip    min []  Paraffin,  details:     min []  Vasopneumatic Device, press/temp:     min []  Whirlpool / Fluido:    If using vaso (only need to measure limb vaso being performed on)      pre-treatment girth :       post-treatment girth :       measured at (landmark location) :      min []  Other:    Skin assessment post-treatment:   Intact      Therapeutic Procedures:  Tx Min Billable or 1:1 Min (if diff from Tx Min) Procedure, Rationale, Specifics     08043 Therapeutic Exercise (timed):  increase ROM, strength, coordination, balance, and proprioception to improve patient's ability to progress to PLOF and address remaining functional goals. (see flow sheet as applicable)     Details if applicable:        []   assessing strength/ROM  []   assessing activity performance (ex: sit to stand, stair negotiation)  []

## 2024-03-22 ENCOUNTER — HOSPITAL ENCOUNTER (OUTPATIENT)
Facility: HOSPITAL | Age: 54
Setting detail: RECURRING SERIES
Discharge: HOME | End: 2024-03-25
Payer: OTHER GOVERNMENT

## 2024-03-22 PROCEDURE — 97530 THERAPEUTIC ACTIVITIES: CPT

## 2024-03-26 ENCOUNTER — APPOINTMENT (OUTPATIENT)
Facility: HOSPITAL | Age: 54
End: 2024-03-26
Payer: OTHER GOVERNMENT

## 2024-03-29 ENCOUNTER — HOSPITAL ENCOUNTER (OUTPATIENT)
Facility: HOSPITAL | Age: 54
Setting detail: RECURRING SERIES
Discharge: HOME | End: 2024-04-01
Payer: OTHER GOVERNMENT

## 2024-03-29 PROCEDURE — 97110 THERAPEUTIC EXERCISES: CPT

## 2024-03-29 PROCEDURE — 97140 MANUAL THERAPY 1/> REGIONS: CPT

## 2024-03-29 NOTE — PROGRESS NOTES
PHYSICAL / OCCUPATIONAL THERAPY - DAILY TREATMENT NOTE (updated )    Patient Name: Afia Mitchell    Date: 3/29/2024    : 1970  Insurance: Payor: [employer] EAST / Plan: [employer] EAST / Product Type: *No Product type* /      Patient  verified Yes     Visit #   Current / Total 10 12-16   Time   In / Out 7:44 8:25   Pain   In / Out 4/10 0/10   Subjective Functional Status/Changes: much less episodes of pain flare ups. Most episodes are at the end of the night and first thing in the morning. Pain can be 10/10 in morning an night.     still has the muscle tightness in the glut      TREATMENT AREA =  Pain in right leg [M79.604]    OBJECTIVE    Modalities Rationale:     decrease pain and increase tissue extensibility to improve patient's ability to progress to PLOF and address remaining functional goals.     min [] Estim Unattended, type/location:                                      []  w/ice    []  w/heat    min [] Estim Attended, type/location:                                     []  w/US     []  w/ice    []  w/heat    []  TENS insruct      min []  Mechanical Traction: type/lbs                   []  pro   []  sup   []  int   []  cont    []  before manual    []  after manual    min []  Ultrasound, settings/location:      min  unbill []  Ice     [x]  Heat    location/position: L S/L R hip    min []  Paraffin,  details:     min []  Vasopneumatic Device, press/temp:     min []  Whirlpool / Fluido:    If using vaso (only need to measure limb vaso being performed on)      pre-treatment girth :       post-treatment girth :       measured at (landmark location) :      min []  Other:    Skin assessment post-treatment:   Intact      Therapeutic Procedures:  Tx Min Billable or 1:1 Min (if diff from Tx Min) Procedure, Rationale, Specifics   25  78556 Therapeutic Exercise (timed):  increase ROM, strength, coordination, balance, and proprioception to improve patient's ability to progress to PLOF and address

## 2024-04-04 ENCOUNTER — HOSPITAL ENCOUNTER (OUTPATIENT)
Facility: HOSPITAL | Age: 54
Setting detail: RECURRING SERIES
Discharge: HOME | End: 2024-04-07
Payer: OTHER GOVERNMENT

## 2024-04-04 PROCEDURE — 97140 MANUAL THERAPY 1/> REGIONS: CPT

## 2024-04-04 PROCEDURE — 97110 THERAPEUTIC EXERCISES: CPT

## 2024-04-04 NOTE — PROGRESS NOTES
or estim unattended, both untimed codes, in totals to left)  8-22 min = 1 unit; 23-37 min = 2 units; 38-52 min = 3 units; 53-67 min = 4 units; 68-82 min = 5 units   Total Total     [x]  Patient Education billed concurrently with other procedures   [x] Review HEP    [] Progressed/Changed HEP, detail:    [] Other detail:       Objective Information/Functional Measures/Assessment    Per 3/8/24  HHD  L hip abduction:  17.8 lbs,19.7 lbs  R hip abduction:18.8 lbs,18.7 lbs  L hip extension: 38.6 lbs, 39.3 lbs  R hip extension: 37.7, 37.7 lbs    Leg length:  R LE appears shorter  Slight elevated L pelvis with slight anterior rotation of L innominate    Initiated session with similar tx as last session with TPR of L QL and followed with inferior pelvic stretching and also LAD of hip. After this, pelvis appeared level with the L innominate appearing more anterior rotated. Followed with MET which corrected the leg length. Followed with core stab and strengthening today. Left still a 3/10.     Patient will continue to benefit from skilled PT / OT services to modify and progress therapeutic interventions, analyze and address functional mobility deficits, analyze and address ROM deficits, analyze and address strength deficits, analyze and address soft tissue restrictions, analyze and cue for proper movement patterns, analyze and modify for postural abnormalities, and instruct in home and community integration to address functional deficits and attain remaining goals.    Progress toward goals / Updated goals:  []  See Progress Note/Recertification    Short Term Goals: To be accomplished in 4 weeks  1) Pt will be IND with HEP to facilitate self care management.   EVAL: Provided HEP  Current Status: - been working on IND, 03/22/24  Goal Met?  -MET     2) Pt will be able to immediately WB on R LE without sharp pain and dull pain of 5/10 or less.   EVAL:    Current Status: - After repeated prone press up or lumbar extension expressed

## 2024-04-09 ENCOUNTER — APPOINTMENT (OUTPATIENT)
Facility: HOSPITAL | Age: 54
End: 2024-04-09
Payer: OTHER GOVERNMENT

## 2024-04-10 LAB — CREATININE, EXTERNAL: 0.71

## 2024-04-12 ENCOUNTER — HOSPITAL ENCOUNTER (OUTPATIENT)
Facility: HOSPITAL | Age: 54
Setting detail: RECURRING SERIES
Discharge: HOME | End: 2024-04-15
Payer: OTHER GOVERNMENT

## 2024-04-12 PROCEDURE — 97530 THERAPEUTIC ACTIVITIES: CPT

## 2024-04-12 PROCEDURE — 97112 NEUROMUSCULAR REEDUCATION: CPT

## 2024-04-12 NOTE — PROGRESS NOTES
PHYSICAL / OCCUPATIONAL THERAPY - DAILY TREATMENT NOTE (updated )    Patient Name: Afia Mitchell    Date: 2024    : 1970  Insurance: Payor:  EAST / Plan:  EAST / Product Type: *No Product type* /      Patient  verified Yes     Visit #   Current / Total 12 -   Time   In / Out 9:00 9:40   Pain   In / Out 2/10 3/10   Subjective Functional Status/Changes: Pt reports feeling good today especially since last treatment.     TREATMENT AREA =  Pain in right leg [M79.604]    OBJECTIVE    Modalities Rationale:     decrease pain and increase tissue extensibility to improve patient's ability to progress to PLOF and address remaining functional goals.     min [] Estim Unattended, type/location:                                      []  w/ice    []  w/heat    min [] Estim Attended, type/location:                                     []  w/US     []  w/ice    []  w/heat    []  TENS insruct      min []  Mechanical Traction: type/lbs                   []  pro   []  sup   []  int   []  cont    []  before manual    []  after manual    min []  Ultrasound, settings/location:      min  unbill []  Ice     [x]  Heat    location/position: L S/L R hip    min []  Paraffin,  details:     min []  Vasopneumatic Device, press/temp:     min []  Whirlpool / Fluido:    If using vaso (only need to measure limb vaso being performed on)      pre-treatment girth :       post-treatment girth :       measured at (landmark location) :      min []  Other:    Skin assessment post-treatment:   Intact      Therapeutic Procedures:  Tx Min Billable or 1:1 Min (if diff from Tx Min) Procedure, Rationale, Specifics   25  73571 Therapeutic Exercise (timed):  increase ROM, strength, coordination, balance, and proprioception to improve patient's ability to progress to PLOF and address remaining functional goals. (see flow sheet as applicable)     Details if applicable:    Pt heavily educated on the progression of core/lumbar

## 2024-04-16 ENCOUNTER — APPOINTMENT (OUTPATIENT)
Facility: HOSPITAL | Age: 54
End: 2024-04-16
Payer: OTHER GOVERNMENT

## 2024-04-17 ENCOUNTER — HOSPITAL ENCOUNTER (OUTPATIENT)
Facility: HOSPITAL | Age: 54
Setting detail: RECURRING SERIES
Discharge: HOME | End: 2024-04-20
Payer: OTHER GOVERNMENT

## 2024-04-17 PROCEDURE — 97112 NEUROMUSCULAR REEDUCATION: CPT

## 2024-04-17 PROCEDURE — 97110 THERAPEUTIC EXERCISES: CPT

## 2024-04-17 NOTE — THERAPY RECERTIFICATION
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 Baseline creatinine  0.9  - Labs reviewed- Renal function/electrolytes with Estimated Creatinine Clearance: 64.1 mL/min (based on SCr of 1.1 mg/dL). according to latest data. Monitor urine output and serial BMP and adjust therapy as needed. Avoid nephrotoxins and renally dose meds for GFR listed above.    - Highly suspect ATN as an etiology at this time  - Nephrology consult requested, appreciate recs  - renal function is improving and near baseline.   - Resolved   LE without sharp pain and dull pain of 5/10 or less.   EVAL:    Current Status: - After repeated prone press up or lumbar extension expressed 2/10 of R LE in walking  Goal Met?  MET 3/22/2024     3) Pt will improve worst pain levels from initial evaluation level of 10/10 to 7/10 to show improved QOL and improved overall perception of pain-free/more pain-free function with ADLs.   EVAL: worst pain 10/10  Current Status: - pain staying 3/10, 04/4/24  Goal Met?  MET       Long term goals    1) Pt will improve FOTO scores to 68 in order to show detectable change in overall function.    EVAL: FOTO 52  Current Status: 59  Goal Met?  Progressing 3/22/2024     2) Pt will be able to perform lifting > 30 lb from floor without pain signifying improvement in overall functional capacity and activity tolerance.    EVAL:   Current Status: 30 pounds  Goal Met?  MET 4/17/2024     3) Pt will be able to go from sit to standing and walking without pain >3/10   EVAL:   Current Status: - no pain STS today, 3/29/24-4/17/2024  Goal Met?  MET    RECOMMENDATIONS  Continue with therapy 1x/week for 2 weeks to further improve upon lumbar/hip ROM, strength, stability, balance and functional activities before transitioning to DC with HEP. Please advise. Thank you.    If you have any questions/comments please contact us directly at [phone].  Thank you for allowing us to assist in the care of your patient.    Lamberto Cortez, PT       4/17/2024       9:34 AM     Payor:  EAST / Plan:  EAST / Product Type: *No Product type* /     Non-Medicare, can change goals, can adjust or add frequency duration, no signature required        Frequency / Duration:   Patient to be seen   1   times per week for   2    WEEKS    RECOMMENDATIONS  We would like to continue therapy for progress to goals stated above.  Continue per initial Plan of Care.    If you have any questions/comments please contact us directly.  Thank you for allowing us to assist in

## 2024-04-17 NOTE — PROGRESS NOTES
PHYSICAL / OCCUPATIONAL THERAPY - DAILY TREATMENT NOTE (updated )    Patient Name: Afia Mitchell    Date: 2024    : 1970  Insurance: Payor:  EAST / Plan:  EAST / Product Type: *No Product type* /      Patient  verified Yes     Visit #   Current / Total 13 12-16   Time   In / Out 12:20 1:00   Pain   In / Out 2/10 2/10   Subjective Functional Status/Changes: [facility] about 2 hours just experiencing muscle soreness  but aggravation of pain. Continues to complete HEP.     TREATMENT AREA =  Pain in right leg [M79.604]    OBJECTIVE    Modalities Rationale:     decrease pain and increase tissue extensibility to improve patient's ability to progress to PLOF and address remaining functional goals.     min [] Estim Unattended, type/location:                                      []  w/ice    []  w/heat    min [] Estim Attended, type/location:                                     []  w/US     []  w/ice    []  w/heat    []  TENS insruct      min []  Mechanical Traction: type/lbs                   []  pro   []  sup   []  int   []  cont    []  before manual    []  after manual    min []  Ultrasound, settings/location:      min  unbill []  Ice     [x]  Heat    location/position: L S/L R hip    min []  Paraffin,  details:     min []  Vasopneumatic Device, press/temp:     min []  Whirlpool / Fluido:    If using vaso (only need to measure limb vaso being performed on)      pre-treatment girth :       post-treatment girth :       measured at (landmark location) :      min []  Other:    Skin assessment post-treatment:   Intact      Therapeutic Procedures:  Tx Min Billable or 1:1 Min (if diff from Tx Min) Procedure, Rationale, Specifics   25  66050 Therapeutic Exercise (timed):  increase ROM, strength, coordination, balance, and proprioception to improve patient's ability to progress to PLOF and address remaining functional goals. (see flow sheet

## 2024-04-18 ENCOUNTER — APPOINTMENT (OUTPATIENT)
Facility: HOSPITAL | Age: 54
End: 2024-04-18
Payer: OTHER GOVERNMENT

## 2024-04-23 ENCOUNTER — HOSPITAL ENCOUNTER (OUTPATIENT)
Facility: HOSPITAL | Age: 54
Setting detail: RECURRING SERIES
Discharge: HOME | End: 2024-04-26
Payer: OTHER GOVERNMENT

## 2024-04-23 PROCEDURE — 97112 NEUROMUSCULAR REEDUCATION: CPT

## 2024-04-23 PROCEDURE — 97110 THERAPEUTIC EXERCISES: CPT

## 2024-04-23 NOTE — PROGRESS NOTES
the progression of core/lumbar stability exercises.    []   assessing strength/ROM  []   assessing activity performance (ex: sit to stand, stair negotiation)  []   assessing balance/control (ex. Dillard, DGI, SLS)   15  00989 Neuromuscular Re-Education (timed):  improve balance, coordination, kinesthetic sense, posture, core stability and proprioception to improve patient's ability to develop conscious control of individual muscles and awareness of position of extremities in order to progress to PLOF and address remaining functional goals. (see flow sheet as applicable)     Details if applicable:      []   assessing strength/ROM  []   assessing activity performance (ex: sit to stand, stair negotiation)  []   assessing balance/control (ex. Dillard, DGI, SLS)     96814 Therapeutic Activity (timed):  use of dynamic activities replicating functional movements to increase ROM, strength, coordination, balance, and proprioception in order to improve patient's ability to progress to PLOF and address remaining functional goals.  (see flow sheet as applicable)     Details if applicable:      []   assessing strength/ROM  []   assessing activity performance (ex: sit to stand, stair negotiation)  []   assessing balance/control (ex. Dillard, DGI, SLS)     78369 Manual Therapy (timed):  decrease pain, increase ROM, increase tissue extensibility, and decrease trigger points to improve patient's ability to progress to PLOF and address remaining functional goals.  The manual therapy interventions were performed at a separate and distinct time from the therapeutic activities interventions . (see flow sheet as applicable)     Details if applicable:    R S/L rolled pillow under L flank:TPR/DTM L QL and LS para, inferior pelvis mobilization for stretching  Supine: LAD of L hip     MET correction           Details if applicable:     40  [facility] BC Totals Reminder: bill using total billable min of TIMED therapeutic procedures (example: do not include

## 2024-04-30 ENCOUNTER — HOSPITAL ENCOUNTER (OUTPATIENT)
Facility: HOSPITAL | Age: 54
Setting detail: RECURRING SERIES
Discharge: HOME | End: 2024-05-03
Payer: OTHER GOVERNMENT

## 2024-04-30 PROCEDURE — 97112 NEUROMUSCULAR REEDUCATION: CPT

## 2024-04-30 NOTE — THERAPY DISCHARGE
Status: - After repeated prone press up or lumbar extension expressed 2/10 of R LE in walking  Goal Met?  MET 3/22/2024     3) Pt will improve worst pain levels from initial evaluation level of 10/10 to 7/10 to show improved QOL and improved overall perception of pain-free/more pain-free function with ADLs.   EVAL: worst pain 10/10  Current Status: - pain staying 3/10, 04/4/24  Goal Met?  MET       Long term goals     1) Pt will improve FOTO scores to 68 in order to show detectable change in overall function.    EVAL: FOTO 52  Current Status: 78  Goal Met?   4/30/2024 MET     2) Pt will be able to perform lifting > 30 lb from floor without pain signifying improvement in overall functional capacity and activity tolerance.    EVAL:   Current Status: 30 pounds  Goal Met?  MET 4/17/2024     3) Pt will be able to go from sit to standing and walking without pain >3/10   EVAL:   Current Status: - no pain STS today, 3/29/24-4/30/2024  Goal Met?  MET    non-Medicare    RECOMMENDATIONS  Discontinue therapy. Progressing towards or have reached established goals.    If you have any questions/comments please contact us directly at [phone].   Thank you for allowing us to assist in the care of your patient.    Lamberto Cortez, PT       4/30/2024       6:38 AM    Not Medicaid Ins, no signature required for DC

## 2024-04-30 NOTE — PROGRESS NOTES
23-37 min = 2 units; 38-52 min = 3 units; 53-67 min = 4 units; 68-82 min = 5 units   Total Total     [x]  Patient Education billed concurrently with other procedures   [x] Review HEP    [] Progressed/Changed HEP, detail:    [] Other detail:       Objective Information/Functional Measures/Assessment    SEE DN    Progress toward goals / Updated goals:  []  See Progress Note/Recertification    SEE DN      PLAN    []  Upgrade activities as tolerated  [x]  Discharge due to : Pt will be discharged and progress independently with HEP   []  Other:    Lamberto Cortez PT    4/30/2024    6:37 AM    Future Appointments   Date Time Provider Department Center   4/30/2024  7:40 AM Lamberto Cortez, PT [facility]   5/13/2024  8:30 AM Nas Martin MD [facility]

## 2024-05-13 ENCOUNTER — HOSPITAL ENCOUNTER (OUTPATIENT)
Facility: HOSPITAL | Age: 54
Setting detail: SPECIMEN
Discharge: HOME | End: 2024-05-16
Payer: OTHER GOVERNMENT

## 2024-05-13 ENCOUNTER — OFFICE VISIT (OUTPATIENT)
Dept: FAMILY MEDICINE CLINIC | Facility: CLINIC | Age: 54
End: 2024-05-13
Payer: OTHER GOVERNMENT

## 2024-05-13 VITALS
SYSTOLIC BLOOD PRESSURE: 109 MMHG | BODY MASS INDEX: 30.66 KG/M2 | HEIGHT: 64 IN | TEMPERATURE: 97.9 F | RESPIRATION RATE: 18 BRPM | WEIGHT: 179.6 LBS | DIASTOLIC BLOOD PRESSURE: 65 MMHG | OXYGEN SATURATION: 96 % | HEART RATE: 82 BPM

## 2024-05-13 DIAGNOSIS — Z98.84 BARIATRIC SURGERY STATUS: ICD-10-CM

## 2024-05-13 DIAGNOSIS — E55.9 VITAMIN D DEFICIENCY, UNSPECIFIED: ICD-10-CM

## 2024-05-13 DIAGNOSIS — Z00.00 ROUTINE GENERAL MEDICAL EXAMINATION AT A HEALTH CARE FACILITY: ICD-10-CM

## 2024-05-13 DIAGNOSIS — Z11.4 ENCOUNTER FOR SCREENING FOR HIV: ICD-10-CM

## 2024-05-13 DIAGNOSIS — Z00.00 ROUTINE GENERAL MEDICAL EXAMINATION AT A HEALTH CARE FACILITY: Primary | ICD-10-CM

## 2024-05-13 PROBLEM — I10 ESSENTIAL HYPERTENSION: Status: RESOLVED | Noted: 2017-02-07 | Resolved: 2024-05-13

## 2024-05-13 LAB
25(OH)D3 SERPL-MCNC: 30.1 NG/ML (ref 30–100)
CHOLEST SERPL-MCNC: 212 MG/DL
EST. AVERAGE GLUCOSE BLD GHB EST-MCNC: 100 MG/DL
HBA1C MFR BLD: 5.1 % (ref 4.2–5.6)
HDLC SERPL-MCNC: 71 MG/DL (ref 40–60)
HDLC SERPL: 3 (ref 0–5)
LDLC SERPL CALC-MCNC: 121.6 MG/DL (ref 0–100)
LIPID PANEL: ABNORMAL
TRIGL SERPL-MCNC: 97 MG/DL
VLDLC SERPL CALC-MCNC: 19.4 MG/DL

## 2024-05-13 PROCEDURE — 84443 ASSAY THYROID STIM HORMONE: CPT

## 2024-05-13 PROCEDURE — 36415 COLL VENOUS BLD VENIPUNCTURE: CPT

## 2024-05-13 PROCEDURE — 99396 PREV VISIT EST AGE 40-64: CPT | Performed by: INTERNAL MEDICINE

## 2024-05-13 PROCEDURE — 83036 HEMOGLOBIN GLYCOSYLATED A1C: CPT

## 2024-05-13 PROCEDURE — 82306 VITAMIN D 25 HYDROXY: CPT

## 2024-05-13 PROCEDURE — 80061 LIPID PANEL: CPT

## 2024-05-13 PROCEDURE — 87389 HIV-1 AG W/HIV-1&-2 AB AG IA: CPT

## 2024-05-13 SDOH — ECONOMIC STABILITY: INCOME INSECURITY: HOW HARD IS IT FOR YOU TO PAY FOR THE VERY BASICS LIKE FOOD, HOUSING, MEDICAL CARE, AND HEATING?: NOT HARD AT ALL

## 2024-05-13 SDOH — ECONOMIC STABILITY: FOOD INSECURITY: WITHIN THE PAST 12 MONTHS, YOU WORRIED THAT YOUR FOOD WOULD RUN OUT BEFORE YOU GOT MONEY TO BUY MORE.: NEVER TRUE

## 2024-05-13 SDOH — ECONOMIC STABILITY: FOOD INSECURITY: WITHIN THE PAST 12 MONTHS, THE FOOD YOU BOUGHT JUST DIDN'T LAST AND YOU DIDN'T HAVE MONEY TO GET MORE.: NEVER TRUE

## 2024-05-13 ASSESSMENT — ENCOUNTER SYMPTOMS
COUGH: 0
VOMITING: 0
DIARRHEA: 0
WHEEZING: 0
SHORTNESS OF BREATH: 0
NAUSEA: 0
BLOOD IN STOOL: 0

## 2024-05-13 NOTE — PROGRESS NOTES
HISTORY OF PRESENT ILLNESS  Afia Mitchell is a 53 y.o. female    HPI    Patient is in today for her physical.  Status post gastric bypass, stable, she lost 2 pounds since last visit.  Vitamin D deficiency, stable on vitamin D daily.  Patient was seen by heme-onc on April 10, 2024 and had labs done then including CBC/CMP/ferritin/iron/B12 and folic acid, dose labs reviewed today with patient, her iron level was 88, ferritin level was 26, glucose was 72, creatinine was 0.71 with a GFR of 100, ALT 22, AST 25, hemoglobin 13.4, platelets 333, white count 8.1  She wants to come back later for hepatitis B vaccine injection  Review of Systems   Constitutional:  Negative for fatigue and fever.   HENT:  Negative for congestion.    Eyes:  Negative for pain.   Respiratory:  Negative for cough, shortness of breath and wheezing.    Cardiovascular:  Negative for chest pain, palpitations and leg swelling.   Gastrointestinal:  Negative for blood in stool, diarrhea, nausea and vomiting.   Genitourinary:  Negative for dysuria.   Musculoskeletal:  Negative for arthralgias and myalgias.   Skin:  Negative for rash.   Neurological:  Negative for dizziness, weakness, numbness and headaches.   Psychiatric/Behavioral:  Negative for dysphoric mood. The patient is not nervous/anxious.          Physical Exam  Vitals reviewed.   Constitutional:       Appearance: Normal appearance.   HENT:      Head: Normocephalic and atraumatic.      Right Ear: Tympanic membrane, ear canal and external ear normal.      Left Ear: Tympanic membrane, ear canal and external ear normal.      Nose: Nose normal.      Mouth/Throat:      Pharynx: No oropharyngeal exudate or posterior oropharyngeal erythema.   Eyes:      Extraocular Movements: Extraocular movements intact.      Conjunctiva/sclera: Conjunctivae normal.      Pupils: Pupils are equal, round, and reactive to light.   Neck:      Vascular: No carotid bruit.   Cardiovascular:      Rate and Rhythm: Normal

## 2024-05-13 NOTE — PROGRESS NOTES
Afia Mitchell is a 53 y.o. female (: 1970) presenting to address:    Chief Complaint   Patient presents with    Annual Exam       Vitals:    24 0840   BP: 109/65   Pulse: 82   Resp: 18   Temp: 97.9 °F (36.6 °C)   SpO2: 96%       \"Have you been to the ER, urgent care clinic since your last visit?  Hospitalized since your last visit?\"    NO    “Have you seen or consulted any other health care providers outside of [facility] since your last visit?”    YES - When: approximately 1 months ago.  Where and Why: Hematology lab work was done.

## 2024-05-14 LAB
HIV 1+2 AB+HIV1 P24 AG SERPL QL IA: NONREACTIVE
HIV 1/2 RESULT COMMENT: NORMAL

## 2024-05-15 LAB — TSH SERPL DL<=0.05 MIU/L-ACNC: 1.53 UIU/ML (ref 0.45–4.5)

## 2024-07-07 DIAGNOSIS — G47.33 OSA ON CPAP: Primary | ICD-10-CM

## 2025-02-24 SDOH — ECONOMIC STABILITY: FOOD INSECURITY: WITHIN THE PAST 12 MONTHS, YOU WORRIED THAT YOUR FOOD WOULD RUN OUT BEFORE YOU GOT MONEY TO BUY MORE.: NEVER TRUE

## 2025-02-24 SDOH — ECONOMIC STABILITY: INCOME INSECURITY: IN THE LAST 12 MONTHS, WAS THERE A TIME WHEN YOU WERE NOT ABLE TO PAY THE MORTGAGE OR RENT ON TIME?: NO

## 2025-02-24 SDOH — ECONOMIC STABILITY: TRANSPORTATION INSECURITY
IN THE PAST 12 MONTHS, HAS LACK OF TRANSPORTATION KEPT YOU FROM MEETINGS, WORK, OR FROM GETTING THINGS NEEDED FOR DAILY LIVING?: NO

## 2025-02-24 SDOH — ECONOMIC STABILITY: FOOD INSECURITY: WITHIN THE PAST 12 MONTHS, THE FOOD YOU BOUGHT JUST DIDN'T LAST AND YOU DIDN'T HAVE MONEY TO GET MORE.: NEVER TRUE

## 2025-02-24 SDOH — ECONOMIC STABILITY: TRANSPORTATION INSECURITY
IN THE PAST 12 MONTHS, HAS THE LACK OF TRANSPORTATION KEPT YOU FROM MEDICAL APPOINTMENTS OR FROM GETTING MEDICATIONS?: NO

## 2025-02-27 ENCOUNTER — HOSPITAL ENCOUNTER (OUTPATIENT)
Facility: HOSPITAL | Age: 55
Setting detail: SPECIMEN
Discharge: HOME | End: 2025-02-27
Payer: OTHER GOVERNMENT

## 2025-02-27 ENCOUNTER — OFFICE VISIT (OUTPATIENT)
Dept: FAMILY MEDICINE CLINIC | Facility: CLINIC | Age: 55
End: 2025-02-27

## 2025-02-27 VITALS
HEART RATE: 96 BPM | DIASTOLIC BLOOD PRESSURE: 74 MMHG | SYSTOLIC BLOOD PRESSURE: 111 MMHG | OXYGEN SATURATION: 95 % | RESPIRATION RATE: 16 BRPM | BODY MASS INDEX: 31.72 KG/M2 | HEIGHT: 64 IN | TEMPERATURE: 98.2 F | WEIGHT: 185.8 LBS

## 2025-02-27 DIAGNOSIS — R39.15 URINARY URGENCY: Primary | ICD-10-CM

## 2025-02-27 LAB
BILIRUBIN, URINE, POC: NEGATIVE
BLOOD URINE, POC: NEGATIVE
GLUCOSE URINE, POC: NEGATIVE
KETONES, URINE, POC: NEGATIVE
LEUKOCYTE ESTERASE, URINE, POC: ABNORMAL
NITRITE, URINE, POC: NEGATIVE
PH, URINE, POC: 7 (ref 4.6–8)
PROTEIN,URINE, POC: NEGATIVE
SPECIFIC GRAVITY, URINE, POC: 1.02 (ref 1–1.03)
URINALYSIS CLARITY, POC: CLEAR
URINALYSIS COLOR, POC: YELLOW
UROBILINOGEN, POC: NORMAL

## 2025-02-27 PROCEDURE — 87086 URINE CULTURE/COLONY COUNT: CPT

## 2025-02-27 PROCEDURE — 87088 URINE BACTERIA CULTURE: CPT

## 2025-02-27 PROCEDURE — 87186 SC STD MICRODIL/AGAR DIL: CPT

## 2025-02-27 RX ORDER — CIPROFLOXACIN 500 MG/1
500 TABLET, FILM COATED ORAL 2 TIMES DAILY
Qty: 14 TABLET | Refills: 0 | Status: SHIPPED | OUTPATIENT
Start: 2025-02-27

## 2025-02-27 SDOH — ECONOMIC STABILITY: FOOD INSECURITY: WITHIN THE PAST 12 MONTHS, THE FOOD YOU BOUGHT JUST DIDN'T LAST AND YOU DIDN'T HAVE MONEY TO GET MORE.: NEVER TRUE

## 2025-02-27 SDOH — ECONOMIC STABILITY: FOOD INSECURITY: WITHIN THE PAST 12 MONTHS, YOU WORRIED THAT YOUR FOOD WOULD RUN OUT BEFORE YOU GOT MONEY TO BUY MORE.: NEVER TRUE

## 2025-02-27 ASSESSMENT — PATIENT HEALTH QUESTIONNAIRE - PHQ9
SUM OF ALL RESPONSES TO PHQ QUESTIONS 1-9: 0
SUM OF ALL RESPONSES TO PHQ9 QUESTIONS 1 & 2: 0
SUM OF ALL RESPONSES TO PHQ QUESTIONS 1-9: 0
1. LITTLE INTEREST OR PLEASURE IN DOING THINGS: NOT AT ALL
SUM OF ALL RESPONSES TO PHQ QUESTIONS 1-9: 0
2. FEELING DOWN, DEPRESSED OR HOPELESS: NOT AT ALL
SUM OF ALL RESPONSES TO PHQ QUESTIONS 1-9: 0

## 2025-02-27 ASSESSMENT — ENCOUNTER SYMPTOMS
NAUSEA: 0
ABDOMINAL PAIN: 0

## 2025-02-27 NOTE — PROGRESS NOTES
Afia Mitchell is a 54 y.o. female (: 1970) presenting to address:    Chief Complaint   Patient presents with    Medication Check       Vitals:    25 0756   BP: 111/74   Pulse: 96   Resp: 16   Temp: 98.2 °F (36.8 °C)   SpO2: 95%       \"Have you been to the ER, urgent care clinic since your last visit?  Hospitalized since your last visit?\"    YES - When: approximately 2  weeks ago.  Where and Why: ER for Bladder infection.    “Have you seen or consulted any other health care providers outside of [facility] since your last visit?”    YES - When: approximately 2  weeks ago.  Where and Why: OBGYN.        “Have you had a pap smear?”    YES - Where: [facility] Nurse/CMA to request most recent records if not in the chart    Date of last Cervical Cancer screen (HPV or PAP): 2021

## 2025-02-27 NOTE — PROGRESS NOTES
HISTORY OF PRESENT ILLNESS  Afia Mitchell is a 54 y.o. female    HPI  Patient is in today complaining of right flank pain, patient went to urgent care 3 weeks ago for abdominal pain, she was given Macrobid for UTI, she did not improve, she went to the emergency room at the [facility] and had a negative CT scan of the abdomen, she was given cefpodoxime, she improved slightly but she is still having right flank pain, and slight discomfort with urination, no hematuria, she still having some chills at night off and on.    Review of Systems   Constitutional:  Negative for fever.   Gastrointestinal:  Negative for abdominal pain and nausea.   Genitourinary:  Negative for hematuria.         Physical Exam  Vitals reviewed.   Cardiovascular:      Rate and Rhythm: Normal rate and regular rhythm.   Abdominal:      Tenderness: There is no abdominal tenderness. There is right CVA tenderness.          ASSESSMENT and PLAN    1. Urinary urgency, her UA is still positive for UTI  -     AMB POC URINALYSIS DIP STICK AUTO W/O MICRO  -     Culture, Urine; Future  -     ciprofloxacin (CIPRO) 500 MG tablet; Take 1 tablet by mouth 2 times daily, Disp-14 tablet, R-0Normal     Results for orders placed or performed in visit on 02/27/25   AMB POC URINALYSIS DIP STICK AUTO W/O MICRO   Result Value Ref Range    Color, Urine, POC Yellow     Clarity, Urine, POC Clear     Glucose, Urine, POC Negative     Bilirubin, Urine, POC Negative     Ketones, Urine, POC Negative     Specific Gravity, Urine, POC 1.020 (A) 1.001 - 1.035    Blood, Urine, POC Negative Negative    pH, Urine, POC 7.0 4.6 - 8.0    Protein, Urine, POC Negative     Urobilinogen, POC Normal     Nitrite, Urine, POC Negative     Leukocyte Esterase, Urine, POC 1+          Return if symptoms worsen or fail to improve.

## 2025-03-01 LAB
BACTERIA SPEC CULT: ABNORMAL
CC UR VC: ABNORMAL
SERVICE CMNT-IMP: ABNORMAL

## 2025-03-12 DIAGNOSIS — D68.51 FACTOR V LEIDEN MUTATION: Primary | ICD-10-CM

## 2025-05-30 ENCOUNTER — OFFICE VISIT (OUTPATIENT)
Dept: FAMILY MEDICINE CLINIC | Facility: CLINIC | Age: 55
End: 2025-05-30
Payer: OTHER GOVERNMENT

## 2025-05-30 VITALS
SYSTOLIC BLOOD PRESSURE: 110 MMHG | HEART RATE: 84 BPM | RESPIRATION RATE: 16 BRPM | DIASTOLIC BLOOD PRESSURE: 74 MMHG | TEMPERATURE: 97.9 F | HEIGHT: 64 IN | BODY MASS INDEX: 31.55 KG/M2 | WEIGHT: 184.8 LBS | OXYGEN SATURATION: 95 %

## 2025-05-30 DIAGNOSIS — Z00.00 ROUTINE GENERAL MEDICAL EXAMINATION AT A HEALTH CARE FACILITY: Primary | ICD-10-CM

## 2025-05-30 DIAGNOSIS — Z23 NEED FOR VACCINATION: ICD-10-CM

## 2025-05-30 DIAGNOSIS — Z98.84 BARIATRIC SURGERY STATUS: ICD-10-CM

## 2025-05-30 DIAGNOSIS — E55.9 VITAMIN D DEFICIENCY, UNSPECIFIED: ICD-10-CM

## 2025-05-30 PROCEDURE — 99396 PREV VISIT EST AGE 40-64: CPT | Performed by: INTERNAL MEDICINE

## 2025-05-30 PROCEDURE — 90471 IMMUNIZATION ADMIN: CPT | Performed by: INTERNAL MEDICINE

## 2025-05-30 PROCEDURE — 90677 PCV20 VACCINE IM: CPT | Performed by: INTERNAL MEDICINE

## 2025-05-30 ASSESSMENT — ENCOUNTER SYMPTOMS
DIARRHEA: 0
SHORTNESS OF BREATH: 0
NAUSEA: 0
COUGH: 0
ABDOMINAL PAIN: 0
WHEEZING: 0

## 2025-05-30 NOTE — PROGRESS NOTES
HISTORY OF PRESENT ILLNESS  Afia Mitchell is a 54 y.o. female    HPI  Patient is in today for her physical, doing well.  Vitamin D deficiency, stable, on vitamin D daily.  Status post bariatric surgery, stable, she is on multivitamins daily.    Review of Systems   Constitutional:  Negative for fever.   Eyes:  Negative for visual disturbance.   Respiratory:  Negative for cough, shortness of breath and wheezing.    Cardiovascular:  Negative for chest pain, palpitations and leg swelling.   Gastrointestinal:  Negative for abdominal pain, diarrhea and nausea.   Genitourinary:  Negative for dysuria.   Musculoskeletal:  Negative for arthralgias and myalgias.   Skin:  Negative for rash.   Neurological:  Negative for weakness, numbness and headaches.   Psychiatric/Behavioral:  Negative for dysphoric mood. The patient is not nervous/anxious.          Physical Exam  Vitals reviewed.   Constitutional:       Appearance: Normal appearance.   HENT:      Head: Normocephalic and atraumatic.      Right Ear: Tympanic membrane, ear canal and external ear normal.      Left Ear: Tympanic membrane, ear canal and external ear normal.      Nose: Nose normal.      Mouth/Throat:      Pharynx: No oropharyngeal exudate or posterior oropharyngeal erythema.   Eyes:      Extraocular Movements: Extraocular movements intact.      Conjunctiva/sclera: Conjunctivae normal.      Pupils: Pupils are equal, round, and reactive to light.   Neck:      Vascular: No carotid bruit.   Cardiovascular:      Rate and Rhythm: Normal rate and regular rhythm.      Pulses: Normal pulses.      Heart sounds: Normal heart sounds. No murmur heard.  Pulmonary:      Effort: Pulmonary effort is normal.      Breath sounds: Normal breath sounds. No wheezing or rales.   Chest:      Chest wall: No tenderness.   Abdominal:      General: Bowel sounds are normal.      Palpations: Abdomen is soft. There is no hepatomegaly or splenomegaly.      Tenderness: There is no abdominal

## 2025-06-03 ENCOUNTER — HOSPITAL ENCOUNTER (OUTPATIENT)
Facility: HOSPITAL | Age: 55
Setting detail: SPECIMEN
Discharge: HOME | End: 2025-06-06
Payer: OTHER GOVERNMENT

## 2025-06-03 DIAGNOSIS — Z98.84 BARIATRIC SURGERY STATUS: ICD-10-CM

## 2025-06-03 DIAGNOSIS — E55.9 VITAMIN D DEFICIENCY, UNSPECIFIED: ICD-10-CM

## 2025-06-03 DIAGNOSIS — Z00.00 ROUTINE GENERAL MEDICAL EXAMINATION AT A HEALTH CARE FACILITY: ICD-10-CM

## 2025-06-03 LAB
25(OH)D3 SERPL-MCNC: 43.1 NG/ML (ref 30–100)
ALBUMIN SERPL-MCNC: 4.3 G/DL (ref 3.4–5)
ALBUMIN/GLOB SERPL: 1.5 (ref 0.8–1.7)
ALP SERPL-CCNC: 102 U/L (ref 45–117)
ALT SERPL-CCNC: 21 U/L (ref 10–35)
ANION GAP SERPL CALC-SCNC: 11 MMOL/L (ref 3–18)
AST SERPL-CCNC: 25 U/L (ref 10–38)
BASOPHILS # BLD: 0.04 K/UL (ref 0–0.1)
BASOPHILS NFR BLD: 0.6 % (ref 0–2)
BILIRUB SERPL-MCNC: 0.3 MG/DL (ref 0.2–1)
BUN SERPL-MCNC: 22 MG/DL (ref 6–23)
BUN/CREAT SERPL: 30 (ref 12–20)
CALCIUM SERPL-MCNC: 10.4 MG/DL (ref 8.5–10.1)
CHLORIDE SERPL-SCNC: 102 MMOL/L (ref 98–107)
CHOLEST SERPL-MCNC: 221 MG/DL
CO2 SERPL-SCNC: 28 MMOL/L (ref 21–32)
CREAT SERPL-MCNC: 0.72 MG/DL (ref 0.6–1.3)
DIFFERENTIAL METHOD BLD: NORMAL
EOSINOPHIL # BLD: 0.11 K/UL (ref 0–0.4)
EOSINOPHIL NFR BLD: 1.5 % (ref 0–5)
ERYTHROCYTE [DISTWIDTH] IN BLOOD BY AUTOMATED COUNT: 12.5 % (ref 11.6–14.5)
EST. AVERAGE GLUCOSE BLD GHB EST-MCNC: 109 MG/DL
FOLATE SERPL-MCNC: 8.4 NG/ML (ref 4.6–34.8)
GLOBULIN SER CALC-MCNC: 2.9 G/DL (ref 2–4)
GLUCOSE SERPL-MCNC: 85 MG/DL (ref 74–108)
HBA1C MFR BLD: 5.4 % (ref 4.2–5.6)
HCT VFR BLD AUTO: 44.5 % (ref 35–45)
HDLC SERPL-MCNC: 47 MG/DL (ref 40–60)
HDLC SERPL: 4.7 (ref 0–5)
HGB BLD-MCNC: 14.8 G/DL (ref 12–16)
IMM GRANULOCYTES # BLD AUTO: 0.02 K/UL (ref 0–0.04)
IMM GRANULOCYTES NFR BLD AUTO: 0.3 % (ref 0–0.5)
IRON SATN MFR SERPL: 19 %
IRON SERPL-MCNC: 68 UG/DL (ref 50–175)
LDLC SERPL CALC-MCNC: 157 MG/DL (ref 0–100)
LYMPHOCYTES # BLD: 1.94 K/UL (ref 0.9–3.6)
LYMPHOCYTES NFR BLD: 27.2 % (ref 21–52)
MCH RBC QN AUTO: 30.1 PG (ref 24–34)
MCHC RBC AUTO-ENTMCNC: 33.3 G/DL (ref 31–37)
MCV RBC AUTO: 90.6 FL (ref 78–100)
MONOCYTES # BLD: 0.67 K/UL (ref 0.05–1.2)
MONOCYTES NFR BLD: 9.4 % (ref 3–10)
NEUTS SEG # BLD: 4.35 K/UL (ref 1.8–8)
NEUTS SEG NFR BLD: 61 % (ref 40–73)
NRBC # BLD: 0 K/UL (ref 0–0.01)
NRBC BLD-RTO: 0 PER 100 WBC
PLATELET # BLD AUTO: 324 K/UL (ref 135–420)
PMV BLD AUTO: 10.9 FL (ref 9.2–11.8)
POTASSIUM SERPL-SCNC: 4.6 MMOL/L (ref 3.5–5.5)
PROT SERPL-MCNC: 7.2 G/DL (ref 6.4–8.2)
RBC # BLD AUTO: 4.91 M/UL (ref 4.2–5.3)
SODIUM SERPL-SCNC: 141 MMOL/L (ref 136–145)
TIBC SERPL-MCNC: 357 UG/DL (ref 250–450)
TRIGL SERPL-MCNC: 87 MG/DL (ref 0–150)
UIBC SERPL-MCNC: 289 UG/DL (ref 112–347)
VIT B12 SERPL-MCNC: 840 PG/ML (ref 211–911)
VLDLC SERPL CALC-MCNC: 17 MG/DL
WBC # BLD AUTO: 7.1 K/UL (ref 4.6–13.2)

## 2025-06-03 PROCEDURE — 83550 IRON BINDING TEST: CPT

## 2025-06-03 PROCEDURE — 80053 COMPREHEN METABOLIC PANEL: CPT

## 2025-06-03 PROCEDURE — 83540 ASSAY OF IRON: CPT

## 2025-06-03 PROCEDURE — 82746 ASSAY OF FOLIC ACID SERUM: CPT

## 2025-06-03 PROCEDURE — 82306 VITAMIN D 25 HYDROXY: CPT

## 2025-06-03 PROCEDURE — 85025 COMPLETE CBC W/AUTO DIFF WBC: CPT

## 2025-06-03 PROCEDURE — 36415 COLL VENOUS BLD VENIPUNCTURE: CPT

## 2025-06-03 PROCEDURE — 83036 HEMOGLOBIN GLYCOSYLATED A1C: CPT

## 2025-06-03 PROCEDURE — 82607 VITAMIN B-12: CPT

## 2025-06-03 PROCEDURE — 80061 LIPID PANEL: CPT

## 2025-06-04 ENCOUNTER — RESULTS FOLLOW-UP (OUTPATIENT)
Dept: FAMILY MEDICINE CLINIC | Facility: CLINIC | Age: 55
End: 2025-06-04

## 2025-06-27 ENCOUNTER — OFFICE VISIT (OUTPATIENT)
Dept: FAMILY MEDICINE CLINIC | Facility: CLINIC | Age: 55
End: 2025-06-27
Payer: OTHER GOVERNMENT

## 2025-06-27 VITALS
TEMPERATURE: 98.2 F | WEIGHT: 178.2 LBS | RESPIRATION RATE: 16 BRPM | SYSTOLIC BLOOD PRESSURE: 126 MMHG | BODY MASS INDEX: 30.42 KG/M2 | DIASTOLIC BLOOD PRESSURE: 80 MMHG | HEIGHT: 64 IN | HEART RATE: 80 BPM | OXYGEN SATURATION: 96 %

## 2025-06-27 DIAGNOSIS — M62.838 MUSCLE SPASM: Primary | ICD-10-CM

## 2025-06-27 PROCEDURE — 99213 OFFICE O/P EST LOW 20 MIN: CPT | Performed by: INTERNAL MEDICINE

## 2025-06-27 RX ORDER — CYCLOBENZAPRINE HCL 10 MG
10 TABLET ORAL NIGHTLY PRN
Qty: 30 TABLET | Refills: 1 | Status: SHIPPED | OUTPATIENT
Start: 2025-06-27

## 2025-06-27 NOTE — PROGRESS NOTES
HISTORY OF PRESENT ILLNESS  Afia Mitchell is a 54 y.o. female    HPI  Patient is in today complaining of muscle cramps, patient stated that she has been having cramps in her right toes, she had 3 episodes in the last week or so, in the evening, no cramps during the day, no dehydration, no calf pain or spasm, no rash  She had labs 3 weeks ago, they were normal.    Review of Systems   Constitutional:  Negative for fever.   Skin:  Negative for rash.   Neurological:  Negative for weakness and numbness.         Physical Exam  Vitals reviewed.   Cardiovascular:      Pulses:           Dorsalis pedis pulses are 2+ on the right side and 2+ on the left side.        Posterior tibial pulses are 2+ on the right side and 2+ on the left side.   Musculoskeletal:      Right foot: Normal.      Left foot: Normal.   Skin:     Findings: No rash.          ASSESSMENT and PLAN    1. Muscle spasm  -     Magnesium; Future  -     Thyroid Cascade Profile; Future  -     cyclobenzaprine (FLEXERIL) 10 MG tablet; Take 1 tablet by mouth nightly as needed for Muscle spasms, Disp-30 tablet, R-1Normal     Results for orders placed or performed during the hospital encounter of 06/03/25 (from the past 2160 hours)   Vitamin D 25 Hydroxy   Result Value Ref Range    Vit D, 25-Hydroxy 43.1 30.0 - 100.0 ng/mL   CBC with Auto Differential   Result Value Ref Range    WBC 7.1 4.6 - 13.2 K/uL    RBC 4.91 4.20 - 5.30 M/uL    Hemoglobin 14.8 12.0 - 16.0 g/dL    Hematocrit 44.5 35.0 - 45.0 %    MCV 90.6 78.0 - 100.0 FL    MCH 30.1 24.0 - 34.0 PG    MCHC 33.3 31.0 - 37.0 g/dL    RDW 12.5 11.6 - 14.5 %    Platelets 324 135 - 420 K/uL    MPV 10.9 9.2 - 11.8 FL    Nucleated RBCs 0.0 0  WBC    nRBC 0.00 0.00 - 0.01 K/uL    Neutrophils % 61.0 40.0 - 73.0 %    Lymphocytes % 27.2 21.0 - 52.0 %    Monocytes % 9.4 3.0 - 10.0 %    Eosinophils % 1.5 0.0 - 5.0 %    Basophils % 0.6 0.0 - 2.0 %    Immature Granulocytes % 0.3 0.0 - 0.5 %    Neutrophils Absolute 4.35

## 2025-06-27 NOTE — PROGRESS NOTES
Afia Mitchell is a 54 y.o. female (: 1970) presenting to address:    Chief Complaint   Patient presents with    Muscle Pain       Vitals:    25 1600   BP: 126/80   Pulse: 80   Resp: 16   Temp: 98.2 °F (36.8 °C)   SpO2: 96%       \"Have you been to the ER, urgent care clinic since your last visit?  Hospitalized since your last visit?\"    NO    “Have you seen or consulted any other [facility] providers [facility]?”    NO        “Have you had a pap smear?”    NO    Date of last Cervical Cancer screen (HPV or PAP): 2021

## 2025-06-30 ENCOUNTER — HOSPITAL ENCOUNTER (OUTPATIENT)
Facility: HOSPITAL | Age: 55
Setting detail: SPECIMEN
Discharge: HOME | End: 2025-07-03
Payer: OTHER GOVERNMENT

## 2025-06-30 DIAGNOSIS — M62.838 MUSCLE SPASM: ICD-10-CM

## 2025-06-30 LAB — MAGNESIUM SERPL-MCNC: 2.4 MG/DL (ref 1.6–2.6)

## 2025-06-30 PROCEDURE — 36415 COLL VENOUS BLD VENIPUNCTURE: CPT

## 2025-06-30 PROCEDURE — 83735 ASSAY OF MAGNESIUM: CPT

## 2025-06-30 PROCEDURE — 84443 ASSAY THYROID STIM HORMONE: CPT

## 2025-07-02 LAB — TSH SERPL DL<=0.05 MIU/L-ACNC: 2.17 UIU/ML (ref 0.45–4.5)

## 2025-07-07 ENCOUNTER — RESULTS FOLLOW-UP (OUTPATIENT)
Dept: FAMILY MEDICINE CLINIC | Facility: CLINIC | Age: 55
End: 2025-07-07

## 2025-08-14 ENCOUNTER — OFFICE VISIT (OUTPATIENT)
Dept: FAMILY MEDICINE CLINIC | Facility: CLINIC | Age: 55
End: 2025-08-14
Payer: OTHER GOVERNMENT

## 2025-08-14 ENCOUNTER — HOSPITAL ENCOUNTER (OUTPATIENT)
Facility: HOSPITAL | Age: 55
Setting detail: SPECIMEN
Discharge: HOME | End: 2025-08-17
Payer: OTHER GOVERNMENT

## 2025-08-14 VITALS
BODY MASS INDEX: 29.5 KG/M2 | WEIGHT: 172.8 LBS | HEART RATE: 95 BPM | SYSTOLIC BLOOD PRESSURE: 132 MMHG | TEMPERATURE: 98.1 F | RESPIRATION RATE: 16 BRPM | OXYGEN SATURATION: 96 % | DIASTOLIC BLOOD PRESSURE: 78 MMHG | HEIGHT: 64 IN

## 2025-08-14 DIAGNOSIS — R30.0 DYSURIA: Primary | ICD-10-CM

## 2025-08-14 LAB
BILIRUBIN, URINE, POC: NEGATIVE
BLOOD URINE, POC: NEGATIVE
GLUCOSE URINE, POC: NEGATIVE
KETONES, URINE, POC: NEGATIVE
LEUKOCYTE ESTERASE, URINE, POC: NORMAL
NITRITE, URINE, POC: POSITIVE
PH, URINE, POC: 7 (ref 4.6–8)
PROTEIN,URINE, POC: NEGATIVE
SPECIFIC GRAVITY, URINE, POC: 1.02 (ref 1–1.03)
URINALYSIS CLARITY, POC: NORMAL
URINALYSIS COLOR, POC: YELLOW
UROBILINOGEN, POC: NORMAL

## 2025-08-14 PROCEDURE — 81003 URINALYSIS AUTO W/O SCOPE: CPT | Performed by: INTERNAL MEDICINE

## 2025-08-14 PROCEDURE — 99213 OFFICE O/P EST LOW 20 MIN: CPT | Performed by: INTERNAL MEDICINE

## 2025-08-14 PROCEDURE — 87186 SC STD MICRODIL/AGAR DIL: CPT

## 2025-08-14 PROCEDURE — 87086 URINE CULTURE/COLONY COUNT: CPT

## 2025-08-14 PROCEDURE — 87088 URINE BACTERIA CULTURE: CPT

## 2025-08-14 RX ORDER — CEPHALEXIN 500 MG/1
500 CAPSULE ORAL 2 TIMES DAILY
Qty: 14 CAPSULE | Refills: 0 | Status: SHIPPED | OUTPATIENT
Start: 2025-08-14

## 2025-08-14 ASSESSMENT — ENCOUNTER SYMPTOMS
ABDOMINAL PAIN: 0
NAUSEA: 0

## 2025-08-17 LAB
BACTERIA SPEC CULT: ABNORMAL
CC UR VC: ABNORMAL
SERVICE CMNT-IMP: ABNORMAL

## 2025-09-02 ENCOUNTER — OFFICE VISIT (OUTPATIENT)
Dept: FAMILY MEDICINE CLINIC | Facility: CLINIC | Age: 55
End: 2025-09-02
Payer: OTHER GOVERNMENT

## 2025-09-02 ENCOUNTER — HOSPITAL ENCOUNTER (OUTPATIENT)
Facility: HOSPITAL | Age: 55
Setting detail: SPECIMEN
Discharge: HOME | End: 2025-09-05
Payer: OTHER GOVERNMENT

## 2025-09-02 VITALS
WEIGHT: 172.4 LBS | SYSTOLIC BLOOD PRESSURE: 110 MMHG | HEART RATE: 89 BPM | RESPIRATION RATE: 15 BRPM | DIASTOLIC BLOOD PRESSURE: 75 MMHG | HEIGHT: 64 IN | OXYGEN SATURATION: 96 % | TEMPERATURE: 98.2 F | BODY MASS INDEX: 29.43 KG/M2

## 2025-09-02 DIAGNOSIS — R39.15 URINARY URGENCY: Primary | ICD-10-CM

## 2025-09-02 LAB
BILIRUBIN, URINE, POC: NEGATIVE
BLOOD URINE, POC: NEGATIVE
GLUCOSE URINE, POC: NEGATIVE
KETONES, URINE, POC: NEGATIVE
LEUKOCYTE ESTERASE, URINE, POC: NORMAL
NITRITE, URINE, POC: NEGATIVE
PH, URINE, POC: 6.5 (ref 4.6–8)
PROTEIN,URINE, POC: NEGATIVE
SPECIFIC GRAVITY, URINE, POC: 1.01 (ref 1–1.03)
URINALYSIS CLARITY, POC: NORMAL
URINALYSIS COLOR, POC: YELLOW
UROBILINOGEN, POC: NORMAL

## 2025-09-02 PROCEDURE — 99213 OFFICE O/P EST LOW 20 MIN: CPT | Performed by: INTERNAL MEDICINE

## 2025-09-02 PROCEDURE — 87086 URINE CULTURE/COLONY COUNT: CPT

## 2025-09-02 PROCEDURE — 87088 URINE BACTERIA CULTURE: CPT

## 2025-09-02 PROCEDURE — 81003 URINALYSIS AUTO W/O SCOPE: CPT | Performed by: INTERNAL MEDICINE

## 2025-09-02 RX ORDER — CIPROFLOXACIN 250 MG/1
250 TABLET, FILM COATED ORAL 2 TIMES DAILY
Qty: 14 TABLET | Refills: 0 | Status: SHIPPED | OUTPATIENT
Start: 2025-09-02 | End: 2025-09-09

## 2025-09-02 RX ORDER — PHENAZOPYRIDINE HYDROCHLORIDE 200 MG/1
200 TABLET, FILM COATED ORAL 3 TIMES DAILY PRN
Qty: 9 TABLET | Refills: 0 | Status: SHIPPED | OUTPATIENT
Start: 2025-09-02 | End: 2025-09-05

## 2025-09-02 RX ORDER — FLUCONAZOLE 150 MG/1
150 TABLET ORAL
Qty: 2 TABLET | Refills: 0 | Status: SHIPPED | OUTPATIENT
Start: 2025-09-02 | End: 2025-09-08

## 2025-09-04 LAB
BACTERIA SPEC CULT: ABNORMAL
BACTERIA SPEC CULT: ABNORMAL
CC UR VC: ABNORMAL
SERVICE CMNT-IMP: ABNORMAL